# Patient Record
Sex: MALE | Race: WHITE | NOT HISPANIC OR LATINO | Employment: FULL TIME | URBAN - METROPOLITAN AREA
[De-identification: names, ages, dates, MRNs, and addresses within clinical notes are randomized per-mention and may not be internally consistent; named-entity substitution may affect disease eponyms.]

---

## 2019-02-02 ENCOUNTER — HOSPITAL ENCOUNTER (EMERGENCY)
Facility: CLINIC | Age: 24
Discharge: HOME | End: 2019-02-02
Attending: FAMILY MEDICINE | Admitting: FAMILY MEDICINE
Payer: COMMERCIAL

## 2019-02-02 ENCOUNTER — APPOINTMENT (OUTPATIENT)
Dept: GENERAL RADIOLOGY | Facility: CLINIC | Age: 24
End: 2019-02-02
Payer: COMMERCIAL

## 2019-02-02 VITALS
RESPIRATION RATE: 18 BRPM | OXYGEN SATURATION: 98 % | WEIGHT: 165 LBS | TEMPERATURE: 97.2 F | SYSTOLIC BLOOD PRESSURE: 130 MMHG | BODY MASS INDEX: 23.85 KG/M2 | DIASTOLIC BLOOD PRESSURE: 79 MMHG

## 2019-02-02 DIAGNOSIS — S43.101A SEPARATION OF RIGHT ACROMIOCLAVICULAR JOINT, INITIAL ENCOUNTER: ICD-10-CM

## 2019-02-02 PROCEDURE — 99283 EMERGENCY DEPT VISIT LOW MDM: CPT | Mod: Z6 | Performed by: FAMILY MEDICINE

## 2019-02-02 PROCEDURE — 99283 EMERGENCY DEPT VISIT LOW MDM: CPT | Performed by: FAMILY MEDICINE

## 2019-02-02 PROCEDURE — 73030 X-RAY EXAM OF SHOULDER: CPT | Mod: TC,RT
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
## 2019-02-02 NOTE — ED AVS SNAPSHOT
[facility] Emergency Department  [address] [place] [address]  Phone:  [phone]  Fax:  [phone]                                    Marcel Quan   MRN: [id number]    Department:  [facility] Emergency Department   Date of Visit:  2/2/2019           After Visit Summary Signature Page    I have received my discharge instructions, and my questions have been answered. I have discussed any challenges I see with this plan with the nurse or doctor.    ..........................................................................................................................................  Patient/Patient Representative Signature      ..........................................................................................................................................  Patient Representative Print Name and Relationship to Patient    ..................................................               ................................................  Date                                   Time    ..........................................................................................................................................  Reviewed by Signature/Title    ...................................................              ..............................................  Date                                               Time          22EPIC Rev 08/18
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
## 2019-02-02 NOTE — DISCHARGE INSTRUCTIONS
Your x-rays do not show any signs of fracture.  You most likely have an acromioclavicular separation of the right shoulder.  Please see the attached handout.  Ice frequently, and take ibuprofen up to 600 mg 4 times a day as needed for pain.  Use a sling for comfort.  Follow-up in 1 week if not improving.

## 2019-06-14 ENCOUNTER — HOSPITAL ENCOUNTER (EMERGENCY)
Facility: CLINIC | Age: 24
Discharge: HOME | End: 2019-06-14
Attending: FAMILY MEDICINE | Admitting: FAMILY MEDICINE
Payer: COMMERCIAL

## 2019-06-14 ENCOUNTER — APPOINTMENT (OUTPATIENT)
Dept: CT IMAGING | Facility: CLINIC | Age: 24
End: 2019-06-14
Attending: FAMILY MEDICINE
Payer: COMMERCIAL

## 2019-06-14 VITALS
BODY MASS INDEX: 23.85 KG/M2 | RESPIRATION RATE: 16 BRPM | DIASTOLIC BLOOD PRESSURE: 75 MMHG | SYSTOLIC BLOOD PRESSURE: 122 MMHG | HEART RATE: 66 BPM | TEMPERATURE: 98.4 F | WEIGHT: 165 LBS | OXYGEN SATURATION: 96 %

## 2019-06-14 DIAGNOSIS — S09.8XXA BLUNT HEAD TRAUMA, INITIAL ENCOUNTER: ICD-10-CM

## 2019-06-14 DIAGNOSIS — S19.80XA BLUNT TRAUMA OF NECK, INITIAL ENCOUNTER: ICD-10-CM

## 2019-06-14 PROCEDURE — 25000125 ZZHC RX 250: Performed by: FAMILY MEDICINE

## 2019-06-14 PROCEDURE — 25000128 H RX IP 250 OP 636: Performed by: FAMILY MEDICINE

## 2019-06-14 PROCEDURE — 99284 EMERGENCY DEPT VISIT MOD MDM: CPT | Mod: Z6 | Performed by: FAMILY MEDICINE

## 2019-06-14 PROCEDURE — 70491 CT SOFT TISSUE NECK W/DYE: CPT

## 2019-06-14 PROCEDURE — 99285 EMERGENCY DEPT VISIT HI MDM: CPT | Mod: 25 | Performed by: FAMILY MEDICINE

## 2019-06-14 RX ORDER — IOPAMIDOL 755 MG/ML
100 INJECTION, SOLUTION INTRAVASCULAR ONCE
Status: COMPLETED | OUTPATIENT
Start: 2019-06-14 | End: 2019-06-14

## 2019-06-14 RX ADMIN — IOPAMIDOL 80 ML: 755 INJECTION, SOLUTION INTRAVENOUS at 09:14

## 2019-06-14 RX ADMIN — SODIUM CHLORIDE 70 ML: 9 INJECTION, SOLUTION INTRAVENOUS at 09:14

## 2019-06-14 ASSESSMENT — ENCOUNTER SYMPTOMS
FEVER: 0
NUMBNESS: 0
RHINORRHEA: 0
COUGH: 0
SEIZURES: 0
SHORTNESS OF BREATH: 0
DIZZINESS: 0
PHOTOPHOBIA: 0
SORE THROAT: 1
APPETITE CHANGE: 0
TREMORS: 0
SPEECH DIFFICULTY: 0
LIGHT-HEADEDNESS: 0
VOICE CHANGE: 0
HEADACHES: 1
CONFUSION: 0
TROUBLE SWALLOWING: 0
WEAKNESS: 0

## 2019-06-14 NOTE — ED AVS SNAPSHOT
[facility] Emergency Department  [address] [place] [address]  Phone:  [phone]  Fax:  [phone]                                    Marcel Quan   MRN: [id number]    Department:  [facility] Emergency Department   Date of Visit:  6/14/2019           After Visit Summary Signature Page    I have received my discharge instructions, and my questions have been answered. I have discussed any challenges I see with this plan with the nurse or doctor.    ..........................................................................................................................................  Patient/Patient Representative Signature      ..........................................................................................................................................  Patient Representative Print Name and Relationship to Patient    ..................................................               ................................................  Date                                   Time    ..........................................................................................................................................  Reviewed by Signature/Title    ...................................................              ..............................................  Date                                               Time          22EPIC Rev 08/18
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
## 2019-07-28 ENCOUNTER — HOSPITAL ENCOUNTER (EMERGENCY)
Facility: CLINIC | Age: 24
Discharge: HOME | End: 2019-07-28
Attending: EMERGENCY MEDICINE | Admitting: EMERGENCY MEDICINE
Payer: COMMERCIAL

## 2019-07-28 VITALS
WEIGHT: 165 LBS | BODY MASS INDEX: 23.85 KG/M2 | SYSTOLIC BLOOD PRESSURE: 128 MMHG | HEART RATE: 69 BPM | DIASTOLIC BLOOD PRESSURE: 76 MMHG | RESPIRATION RATE: 18 BRPM | OXYGEN SATURATION: 99 % | TEMPERATURE: 97.5 F

## 2019-07-28 DIAGNOSIS — S91.312A LACERATION OF LEFT FOOT, INITIAL ENCOUNTER: ICD-10-CM

## 2019-07-28 PROCEDURE — 90715 TDAP VACCINE 7 YRS/> IM: CPT | Performed by: EMERGENCY MEDICINE

## 2019-07-28 PROCEDURE — 90471 IMMUNIZATION ADMIN: CPT | Performed by: EMERGENCY MEDICINE

## 2019-07-28 PROCEDURE — 25000128 H RX IP 250 OP 636: Performed by: EMERGENCY MEDICINE

## 2019-07-28 PROCEDURE — 99283 EMERGENCY DEPT VISIT LOW MDM: CPT | Mod: Z6 | Performed by: EMERGENCY MEDICINE

## 2019-07-28 PROCEDURE — 99283 EMERGENCY DEPT VISIT LOW MDM: CPT | Performed by: EMERGENCY MEDICINE

## 2019-07-28 RX ADMIN — CLOSTRIDIUM TETANI TOXOID ANTIGEN (FORMALDEHYDE INACTIVATED), CORYNEBACTERIUM DIPHTHERIAE TOXOID ANTIGEN (FORMALDEHYDE INACTIVATED), BORDETELLA PERTUSSIS TOXOID ANTIGEN (GLUTARALDEHYDE INACTIVATED), BORDETELLA PERTUSSIS FILAMENTOUS HEMAGGLUTININ ANTIGEN (FORMALDEHYDE INACTIVATED), BORDETELLA PERTUSSIS PERTACTIN ANTIGEN, AND BORDETELLA PERTUSSIS FIMBRIAE 2/3 ANTIGEN 0.5 ML: 5; 2; 2.5; 5; 3; 5 INJECTION, SUSPENSION INTRAMUSCULAR at 16:22

## 2019-07-28 NOTE — ED AVS SNAPSHOT
[facility] Emergency Department  [address] [place] [address]  Phone:  [phone]  Fax:  [phone]                                    Marcel Quan   MRN: [id number]    Department:  [facility] Emergency Department   Date of Visit:  7/28/2019           After Visit Summary Signature Page    I have received my discharge instructions, and my questions have been answered. I have discussed any challenges I see with this plan with the nurse or doctor.    ..........................................................................................................................................  Patient/Patient Representative Signature      ..........................................................................................................................................  Patient Representative Print Name and Relationship to Patient    ..................................................               ................................................  Date                                   Time    ..........................................................................................................................................  Reviewed by Signature/Title    ...................................................              ..............................................  Date                                               Time          22EPIC Rev 08/18

## 2019-07-28 NOTE — DISCHARGE INSTRUCTIONS
Return to the ER if you develop new or worsening symptoms.  Keep the wound clean and dry.  Apply antibiotic ointment daily.  Leave this pressure bandage on for a couple of days in order to avoid bleeding.

## 2019-07-28 NOTE — ED TRIAGE NOTES
"Pt states that 3 evenings ago he accidentally injured his left top of his foot with a broken piece of glass from a window.  He covered it, but it cont to bleed heavily at times.  \"It will just squirt out and is hard to stop.\"  Foot is painful.     He builds pools and maintains them.    "

## 2019-07-28 NOTE — ED PROVIDER NOTES
History     Chief Complaint   Patient presents with     Laceration     The history is provided by the patient.     Marcel Quan is a 23 year old male who is presenting to the emergency department with complaints of non-stop bleeding from a laceration on his left foot. The patient says he cut the inside of his foot on a piece of glass two days ago. He thinks it hit a vein/artery because every time he goes to clean the wound or change the dressing it starts to squirt out blood. The patient says the laceration will stop bleeding for a little bit, but any movement causes it to start up again. He wrapped the wound tightly and kept it clean.         Allergies:  Allergies   Allergen Reactions     No Known Drug Allergies        Problem List:    There are no active problems to display for this patient.       Past Medical History:    Past Medical History:   Diagnosis Date     Pneumonia      RSV (respiratory syncytial virus infection)        Past Surgical History:    Past Surgical History:   Procedure Laterality Date     NO HISTORY OF SURGERY         Family History:    Family History   Problem Relation Age of Onset     Family History Negative No family hx of        Social History:  Marital Status:  Single [1]  Social History     Tobacco Use     Smoking status: Current Every Day Smoker     Packs/day: 1.00     Years: 0.60     Pack years: 0.60     Types: Cigarettes     Last attempt to quit: 2011     Years since quittin.7     Smokeless tobacco: Former User     Quit date: 2011   Substance Use Topics     Alcohol use: Yes     Comment: 2013 Once a week , 2 drinks     Drug use: No        Medications:      ASPIRIN PO   polyethylene glycol (MIRALAX) powder   psyllium (METAMUCIL SMOOTH TEXTURE) 63 % POWD         Review of Systems   All other systems reviewed and are negative.      Physical Exam   BP: 128/76  Pulse: 69  Temp: 97.5  F (36.4  C)  Resp: 18  Weight: 74.8 kg (165 lb)  SpO2: 99 %      Physical Exam    Constitutional: He is oriented to person, place, and time. He appears well-developed and well-nourished. No distress.   HENT:   Head: Normocephalic and atraumatic.   Eyes: No scleral icterus.   Neck: Normal range of motion. Neck supple.   Cardiovascular: Normal rate.   Neurological: He is alert and oriented to person, place, and time.   Skin: Skin is warm and dry. No rash noted. He is not diaphoretic.   Superficial 1 cm V-shaped laceration over the medial aspect of the left great toe over a small vein.  No bleeding.  There is dried blood all over the foot.   Nursing note and vitals reviewed.      ED Course        Procedures                 No results found for this or any previous visit (from the past 24 hour(s)).    Medications   Tdap (tetanus-diphtheria-acell pertussis) (ADACEL) injection 0.5 mL (0.5 mLs Intramuscular Given 7/28/19 1622)       Assessments & Plan (with Medical Decision Making)  Superficial partially healed laceration over the medial aspect of the left great toe over a small vein.  The blood was cleansed by the ER tech and quick clot was placed along with a pressure bandage.  The patient tolerated the procedure well and there was no bleeding.  Return to ER precautions were discussed.  Tetanus vaccination updated.     I have reviewed the nursing notes.    I have reviewed the findings, diagnosis, plan and need for follow up with the patient.         Medication List      There are no discharge medications for this visit.         Final diagnoses:   Laceration of left foot, initial encounter   This document serves as a record of services personally performed by Aki Graham MD. It was created on their behalf by Tatyana Gudino, a trained medical scribe. The creation of this record is based on the provider's personal observations and the statements of the patient. This document has been checked and approved by the attending provider.  Note: Chart documentation done in part with Dragon Voice  Recognition software. Although reviewed after completion, some word and grammatical errors may remain.        7/28/2019   [facility] EMERGENCY DEPARTMENT     Aki Graham MD  07/28/19 5088

## 2019-12-11 ENCOUNTER — OFFICE VISIT (OUTPATIENT)
Dept: FAMILY MEDICINE | Facility: OTHER | Age: 24
End: 2019-12-11
Payer: COMMERCIAL

## 2019-12-11 VITALS
BODY MASS INDEX: 23.25 KG/M2 | SYSTOLIC BLOOD PRESSURE: 108 MMHG | RESPIRATION RATE: 16 BRPM | HEIGHT: 70 IN | WEIGHT: 162.4 LBS | DIASTOLIC BLOOD PRESSURE: 60 MMHG | HEART RATE: 70 BPM | OXYGEN SATURATION: 98 % | TEMPERATURE: 98.3 F

## 2019-12-11 DIAGNOSIS — Z48.02 ENCOUNTER FOR STAPLE REMOVAL: Primary | ICD-10-CM

## 2019-12-11 DIAGNOSIS — A74.9 CHLAMYDIA INFECTION: ICD-10-CM

## 2019-12-11 DIAGNOSIS — Z11.3 SCREEN FOR STD (SEXUALLY TRANSMITTED DISEASE): ICD-10-CM

## 2019-12-11 DIAGNOSIS — R30.0 DYSURIA: ICD-10-CM

## 2019-12-11 LAB
ALBUMIN UR-MCNC: ABNORMAL MG/DL
APPEARANCE UR: CLEAR
BILIRUB UR QL STRIP: NEGATIVE
COLOR UR AUTO: YELLOW
GLUCOSE UR STRIP-MCNC: NEGATIVE MG/DL
HGB UR QL STRIP: NEGATIVE
KETONES UR STRIP-MCNC: NEGATIVE MG/DL
LEUKOCYTE ESTERASE UR QL STRIP: NEGATIVE
NITRATE UR QL: NEGATIVE
PH UR STRIP: 6 PH (ref 5–7)
RBC #/AREA URNS AUTO: NORMAL /HPF
SOURCE: ABNORMAL
SP GR UR STRIP: >1.03 (ref 1–1.03)
UROBILINOGEN UR STRIP-ACNC: 0.2 EU/DL (ref 0.2–1)
WBC #/AREA URNS AUTO: NORMAL /HPF

## 2019-12-11 PROCEDURE — 86780 TREPONEMA PALLIDUM: CPT | Performed by: PHYSICIAN ASSISTANT

## 2019-12-11 PROCEDURE — 86803 HEPATITIS C AB TEST: CPT | Performed by: PHYSICIAN ASSISTANT

## 2019-12-11 PROCEDURE — 99203 OFFICE O/P NEW LOW 30 MIN: CPT | Performed by: PHYSICIAN ASSISTANT

## 2019-12-11 PROCEDURE — 87340 HEPATITIS B SURFACE AG IA: CPT | Performed by: PHYSICIAN ASSISTANT

## 2019-12-11 PROCEDURE — 87389 HIV-1 AG W/HIV-1&-2 AB AG IA: CPT | Performed by: PHYSICIAN ASSISTANT

## 2019-12-11 PROCEDURE — 86706 HEP B SURFACE ANTIBODY: CPT | Performed by: PHYSICIAN ASSISTANT

## 2019-12-11 PROCEDURE — 87591 N.GONORRHOEAE DNA AMP PROB: CPT | Performed by: PHYSICIAN ASSISTANT

## 2019-12-11 PROCEDURE — 81001 URINALYSIS AUTO W/SCOPE: CPT | Performed by: PHYSICIAN ASSISTANT

## 2019-12-11 PROCEDURE — 87491 CHLMYD TRACH DNA AMP PROBE: CPT | Performed by: PHYSICIAN ASSISTANT

## 2019-12-11 PROCEDURE — 36415 COLL VENOUS BLD VENIPUNCTURE: CPT | Performed by: PHYSICIAN ASSISTANT

## 2019-12-11 ASSESSMENT — MIFFLIN-ST. JEOR: SCORE: 1745.38
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
## 2019-12-12 LAB
C TRACH DNA SPEC QL NAA+PROBE: POSITIVE
HBV SURFACE AB SERPL IA-ACNC: 4.09 M[IU]/ML
HBV SURFACE AG SERPL QL IA: NONREACTIVE
HCV AB SERPL QL IA: NONREACTIVE
HIV 1+2 AB+HIV1 P24 AG SERPL QL IA: NONREACTIVE
N GONORRHOEA DNA SPEC QL NAA+PROBE: NEGATIVE
SPECIMEN SOURCE: ABNORMAL
SPECIMEN SOURCE: NORMAL
T PALLIDUM AB SER QL: NONREACTIVE

## 2019-12-13 RX ORDER — AZITHROMYCIN 500 MG/1
1000 TABLET, FILM COATED ORAL DAILY
Qty: 2 TABLET | Refills: 0 | Status: SHIPPED | OUTPATIENT
Start: 2019-12-13 | End: 2019-12-14

## 2020-09-25 ENCOUNTER — HOSPITAL ENCOUNTER (EMERGENCY)
Facility: CLINIC | Age: 25
Discharge: HOME | End: 2020-09-25
Attending: EMERGENCY MEDICINE | Admitting: EMERGENCY MEDICINE

## 2020-09-25 VITALS
OXYGEN SATURATION: 97 % | TEMPERATURE: 99 F | SYSTOLIC BLOOD PRESSURE: 118 MMHG | RESPIRATION RATE: 16 BRPM | HEART RATE: 77 BPM | WEIGHT: 184 LBS | DIASTOLIC BLOOD PRESSURE: 74 MMHG | BODY MASS INDEX: 26.05 KG/M2

## 2020-09-25 DIAGNOSIS — Z20.2 STD EXPOSURE: ICD-10-CM

## 2020-09-25 LAB
ALBUMIN UR-MCNC: NEGATIVE MG/DL
APPEARANCE UR: CLEAR
BILIRUB UR QL STRIP: NEGATIVE
COLOR UR AUTO: YELLOW
GLUCOSE UR STRIP-MCNC: NEGATIVE MG/DL
HGB UR QL STRIP: NEGATIVE
KETONES UR STRIP-MCNC: NEGATIVE MG/DL
LEUKOCYTE ESTERASE UR QL STRIP: NEGATIVE
NITRATE UR QL: NEGATIVE
PH UR STRIP: 5 PH (ref 5–7)
SOURCE: NORMAL
SP GR UR STRIP: 1.03 (ref 1–1.03)
UROBILINOGEN UR STRIP-MCNC: 0 MG/DL (ref 0–2)

## 2020-09-25 PROCEDURE — 87491 CHLMYD TRACH DNA AMP PROBE: CPT | Performed by: EMERGENCY MEDICINE

## 2020-09-25 PROCEDURE — 96372 THER/PROPH/DIAG INJ SC/IM: CPT | Performed by: EMERGENCY MEDICINE

## 2020-09-25 PROCEDURE — 25000128 H RX IP 250 OP 636: Performed by: EMERGENCY MEDICINE

## 2020-09-25 PROCEDURE — 87591 N.GONORRHOEAE DNA AMP PROB: CPT | Performed by: EMERGENCY MEDICINE

## 2020-09-25 PROCEDURE — 99284 EMERGENCY DEPT VISIT MOD MDM: CPT | Performed by: EMERGENCY MEDICINE

## 2020-09-25 PROCEDURE — 81003 URINALYSIS AUTO W/O SCOPE: CPT | Performed by: EMERGENCY MEDICINE

## 2020-09-25 PROCEDURE — 99284 EMERGENCY DEPT VISIT MOD MDM: CPT | Mod: Z6 | Performed by: EMERGENCY MEDICINE

## 2020-09-25 RX ORDER — CEFTRIAXONE SODIUM 1 G
250 VIAL (EA) INJECTION ONCE
Status: COMPLETED | OUTPATIENT
Start: 2020-09-25 | End: 2020-09-25

## 2020-09-25 RX ORDER — DOXYCYCLINE 100 MG/1
100 CAPSULE ORAL 2 TIMES DAILY
Qty: 28 CAPSULE | Refills: 0 | Status: SHIPPED | OUTPATIENT
Start: 2020-09-25 | End: 2020-10-09

## 2020-09-25 RX ADMIN — CEFTRIAXONE SODIUM 250 MG: 1 INJECTION, POWDER, FOR SOLUTION INTRAMUSCULAR; INTRAVENOUS at 11:26

## 2020-09-25 NOTE — ED TRIAGE NOTES
Pt comes in with complaints of bilateral flank pain. Pt states he has also been exposed to Chlamydia from his S/O. Pt states she was diagnosed 5 days ago and he has had intercourse with her since without any form of barrier protection.

## 2020-09-25 NOTE — ED AVS SNAPSHOT
[facility] Emergency Department  [address] [place] [address]  Phone:  [phone]  Fax:  [phone]                                    Marcel Quan   MRN: [id number]    Department:  [facility] Emergency Department   Date of Visit:  9/25/2020           After Visit Summary Signature Page    I have received my discharge instructions, and my questions have been answered. I have discussed any challenges I see with this plan with the nurse or doctor.    ..........................................................................................................................................  Patient/Patient Representative Signature      ..........................................................................................................................................  Patient Representative Print Name and Relationship to Patient    ..................................................               ................................................  Date                                   Time    ..........................................................................................................................................  Reviewed by Signature/Title    ...................................................              ..............................................  Date                                               Time          22EPIC Rev 08/18

## 2020-09-25 NOTE — ED PROVIDER NOTES
History     Chief Complaint   Patient presents with     Exposure to STD     Flank Pain     HPI  Marcel Quan is a 25 year old male who presents with concerns for exposure to chlamydia.  He states he is significant other was diagnosed 2 days ago.  He has had a previous history of chlamydia back in December of last year.  At that time they did check him for treponemal antibodies, HIV and hepatitis.  Gonorrhea was negative.  He states he is concerned that he may have had a kidney infection as he has bilateral flank pain.  Denies any chest pain or shortness of breath.  No cough.  Has mild diffuse abdominal pain without nausea or vomiting.  He said no diarrhea or change in his stooling pattern.  Denies any hematuria.  No penile lesions.  Denies any testicular swelling or pain.  Denies recent travel.  No exposure to infectious GI illness.  No treatment for his pain prior to arrival.  He denies a history of pyelonephritis or kidney stone.  Denies history of abdominal surgeries.    Allergies:  Allergies   Allergen Reactions     No Known Drug Allergies        Problem List:    There are no active problems to display for this patient.       Past Medical History:    Past Medical History:   Diagnosis Date     Pneumonia      RSV (respiratory syncytial virus infection)        Past Surgical History:    Past Surgical History:   Procedure Laterality Date     NO HISTORY OF SURGERY         Family History:    Family History   Problem Relation Age of Onset     Family History Negative No family hx of        Social History:  Marital Status:  Single [1]  Social History     Tobacco Use     Smoking status: Current Every Day Smoker     Packs/day: 1.00     Years: 0.60     Pack years: 0.60     Types: Cigarettes     Last attempt to quit: 2011     Years since quittin.8     Smokeless tobacco: Former User     Quit date: 2011   Substance Use Topics     Alcohol use: Yes     Comment: weekly     Drug use: No        Medications:     doxycycline hyclate (VIBRAMYCIN) 100 MG capsule          Review of Systems all other systems reviewed and are negative.    Physical Exam   BP: 122/63  Pulse: 79  Temp: 99  F (37.2  C)  Resp: 16  Weight: 83.5 kg (184 lb)  SpO2: 97 %      Physical Exam alert cooperative male does not look toxic or ill.  He has no scleral icterus.  Oral mucosa is moist.  Able speak in complete sentences.  Neck is supple.  Lungs were clear without adventitious sounds.  He has diffuse back and mild bilateral CVA tenderness  with the right being greater than left.  Abdominal exam reveals active bowel sounds.  On palpation he is diffusely mildly tender but does not localize.  He has no guarding or rebound.  There is no organomegaly or masses.  Genital exam he has a circumcised penis without lesion.  The testes are down bilaterally.  No testicular swelling or tenderness.  Cremasteric reflex is normal.  He has no rashes over the groin, penis or scrotum.    ED Course        Procedures               Critical Care time:  none               Results for orders placed or performed during the hospital encounter of 09/25/20 (from the past 24 hour(s))   UA reflex to Microscopic   Result Value Ref Range    Color Urine Yellow     Appearance Urine Clear     Glucose Urine Negative NEG^Negative mg/dL    Bilirubin Urine Negative NEG^Negative    Ketones Urine Negative NEG^Negative mg/dL    Specific Gravity Urine 1.027 1.003 - 1.035    Blood Urine Negative NEG^Negative    pH Urine 5.0 5.0 - 7.0 pH    Protein Albumin Urine Negative NEG^Negative mg/dL    Urobilinogen mg/dL 0.0 0.0 - 2.0 mg/dL    Nitrite Urine Negative NEG^Negative    Leukocyte Esterase Urine Negative NEG^Negative    Source Midstream Urine        Medications   cefTRIAXone (ROCEPHIN) injection 250 mg (has no administration in time range)     A dirty urine is obtained for GC/chlamydia.  Clean urine is obtained looking for possible infection and pyelonephritis.  Assessments & Plan (with Medical  Decision Making)   Marcel Quan is a 25 year old male who presents with concerns for exposure to chlamydia.  He states he is significant other was diagnosed 2 days ago.  He has had a previous history of chlamydia back in December of last year.  At that time they did check him for treponemal antibodies, HIV and hepatitis.  Gonorrhea was negative.  He states he is concerned that he may have had a kidney infection as he has bilateral flank pain.  Denies any chest pain or shortness of breath.  No cough.  Has mild diffuse abdominal pain without nausea or vomiting.  He said no diarrhea or change in his stooling pattern.  Denies any hematuria.  No penile lesions.  Denies any testicular swelling or pain.  Denies recent travel.  No exposure to infectious GI illness.  No treatment for his pain prior to arrival.  He denies a history of pyelonephritis or kidney stone.  Denies history of abdominal surgeries.  On presentation patient was afebrile and vitally stable.  He had mild CVA tenderness and diffuse muscular tenderness in the low back.  Mild nonlocalizing abdominal tenderness.  No guarding or rebound.  No penile lesions or testicular tenderness.  Normal cremasteric reflex.  No rash of the flank, abdomen, or groin/genitalia.  Urine was completely normal so doubt kidney stone or pyelonephritis.  With his unprotected exposure to chlamydia we do have a chlamydia and GC culture pending.  He is given a shot of Rocephin IM and will be on doxycycline.  He should not have unprotected intercourse until both he and his partner have been treated.  He can follow-up if he has new concerning symptoms.  I have reviewed the nursing notes.    I have reviewed the findings, diagnosis, plan and need for follow up with the patient.       New Prescriptions    DOXYCYCLINE HYCLATE (VIBRAMYCIN) 100 MG CAPSULE    Take 1 capsule (100 mg) by mouth 2 times daily for 14 days       Final diagnoses:   STD exposure       9/25/2020   [facility]  EMERGENCY DEPARTMENT     Boo Johnson MD  09/25/20 [id number]

## 2020-09-25 NOTE — DISCHARGE INSTRUCTIONS
Doxycycline as directed for 2 weeks.  Do not have unprotected intercourse until both you and your partner have completed your treatment.

## 2020-09-27 LAB
C TRACH DNA SPEC QL NAA+PROBE: NEGATIVE
N GONORRHOEA DNA SPEC QL NAA+PROBE: NEGATIVE
SPECIMEN SOURCE: NORMAL
SPECIMEN SOURCE: NORMAL

## 2020-09-27 NOTE — RESULT ENCOUNTER NOTE
Final result for both N. Gonorrhoeae PCR and Chlamydia Trachomatis PCR are NEGATIVE.  No treatment or change in treatment per [facility] Result protocol.

## 2023-08-08 ENCOUNTER — TELEPHONE (OUTPATIENT)
Dept: ORTHOPEDICS | Facility: CLINIC | Age: 28
End: 2023-08-08
Payer: COMMERCIAL

## 2023-08-08 ENCOUNTER — TRANSFERRED RECORDS (OUTPATIENT)
Dept: HEALTH INFORMATION MANAGEMENT | Facility: CLINIC | Age: 28
End: 2023-08-08

## 2023-08-08 NOTE — TELEPHONE ENCOUNTER
[facility] Call Center    Phone Message    May a detailed message be left on voicemail: yes     Reason for Call: Other: patient calling with new ankle fracture. Priority line asked for a TE to be sent. I asked patient to have records faxed to us.      Action Taken: Other: sent message to team     Travel Screening: Not Applicable

## 2023-08-08 NOTE — TELEPHONE ENCOUNTER
Appointment is scheduled already. Will triage if call comes back that it needs to come here.     [place]

## 2023-08-09 ENCOUNTER — HOSPITAL ENCOUNTER (OUTPATIENT)
Dept: CT IMAGING | Facility: CLINIC | Age: 28
Discharge: HOME | End: 2023-08-09
Attending: ORTHOPAEDIC SURGERY | Admitting: ORTHOPAEDIC SURGERY
Payer: COMMERCIAL

## 2023-08-09 ENCOUNTER — OFFICE VISIT (OUTPATIENT)
Dept: ORTHOPEDICS | Facility: CLINIC | Age: 28
End: 2023-08-09
Payer: OTHER MISCELLANEOUS

## 2023-08-09 VITALS
SYSTOLIC BLOOD PRESSURE: 116 MMHG | BODY MASS INDEX: 26.41 KG/M2 | DIASTOLIC BLOOD PRESSURE: 68 MMHG | WEIGHT: 195 LBS | OXYGEN SATURATION: 98 % | HEART RATE: 66 BPM | HEIGHT: 72 IN

## 2023-08-09 DIAGNOSIS — S99.911A RIGHT ANKLE INJURY, INITIAL ENCOUNTER: ICD-10-CM

## 2023-08-09 DIAGNOSIS — S82.891A CLOSED FRACTURE OF RIGHT ANKLE, INITIAL ENCOUNTER: ICD-10-CM

## 2023-08-09 DIAGNOSIS — S82.891A CLOSED FRACTURE OF RIGHT ANKLE, INITIAL ENCOUNTER: Primary | ICD-10-CM

## 2023-08-09 PROCEDURE — 73700 CT LOWER EXTREMITY W/O DYE: CPT | Mod: RT

## 2023-08-09 PROCEDURE — 99203 OFFICE O/P NEW LOW 30 MIN: CPT | Performed by: ORTHOPAEDIC SURGERY

## 2023-08-09 RX ORDER — OXYCODONE HYDROCHLORIDE 5 MG/1
5 TABLET ORAL EVERY 4 HOURS PRN
Qty: 20 TABLET | Refills: 0 | Status: SHIPPED | OUTPATIENT
Start: 2023-08-09 | End: 2023-08-14

## 2023-08-09 ASSESSMENT — PAIN SCALES - GENERAL: PAINLEVEL: WORST PAIN (10)

## 2023-08-09 NOTE — LETTER
8/9/2023         RE: Marcel Quan  [address]  [place] [facility] [address]        Dear Colleague,    Thank you for referring your patient, Marcel Quan, to the [facility]. Please see a copy of my visit note below.    SUBJECTIVE:  Marcel Quan is a 28 year old male who is seen as self referral for  right ankle injury that occurred.  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   Cause: Following acute injury.  Mechanism of injury: fall and twisted ankle on curb when he landed  Present symptoms: a lot of pain, diffuse. swelling    Previous treatment::  splint. Hasn't been elevating.    Prior history of related problems: no prior problems with this area in the past.    Past Medical History:   Diagnosis Date     Pneumonia     Has had several times     RSV (respiratory syncytial virus infection) 1998      Past Surgical History:   Procedure Laterality Date     NO HISTORY OF SURGERY         REVIEW OF SYSTEMS:  CONSTITUTIONAL:  NEGATIVE for fever, chills, change in weight  INTEGUMENTARY/SKIN:  NEGATIVE for worrisome rashes, moles or lesions  EYES:  NEGATIVE for vision changes or irritation  ENT/MOUTH:  NEGATIVE for ear, mouth and throat problems  RESP:  NEGATIVE for significant cough or SOB  BREAST:  NEGATIVE for masses, tenderness or discharge  CV:  NEGATIVE for chest pain, palpitations or peripheral edema  GI:  NEGATIVE for nausea, abdominal pain, heartburn, or change in bowel habits  :  Negative   MUSCULOSKELETAL:  See HPI above  NEURO:  NEGATIVE for weakness, dizziness or paresthesias  ENDOCRINE:  NEGATIVE for temperature intolerance, skin/hair changes  HEME/ALLERGY/IMMUNE:  NEGATIVE for bleeding problems  PSYCHIATRIC:  NEGATIVE for changes in mood or affect    EXAM:  /68 (BP Location: Left arm, Patient Position: Sitting, Cuff Size: Adult Regular)   Pulse 66   Ht 1.829 m (6')   Wt 88.5 kg (195 lb)   SpO2 98%   BMI 26.45 kg/m    GENERAL  APPEARANCE: healthy, alert, and no distress   GAIT: not tested   SKIN: intact  NEURO: Normal strength and tone, sensory exam grossly normal, mentation intact, and speech normal  Sensation: intact  PSYCH:  mentation appears normal and affect normal/bright    MUSCULOSKELETAL:    ANKLE  Inspection: Swelling: moderate,     Tender: medial and lateral  Range of Motion:  not tested --all movements painful      X-RAY INTERPRETATION  Xrays from yesterday, 8/8, shows a comminuted displaced medial malleolus fracture.  There is a linear calcification in the lateral gutter and a small fragment at the distal tib-fib joint.  Maybe some bony debris just above the talar dome  There is an acute oblique intra-articular fracture at the base of the   medial malleolus, which demonstrates approximately 9 mm of medial   displacement of the distal fragment.  There is associated distortion of   the medial mortise.  On the oblique view, there is an 8 mm linear density   at the medial aspect of the lateral malleolus, which projects over the   talofibular joint, concerning for an acute fracture (series [id number]).  On   the oblique view, there is a 2 mm density in the region of the lateral   tibial plafond and lateral talar dome, concerning for an acute fracture   (series [id number]).  No dislocation.  There is medial and lateral ankle soft   tissue swelling.     ASSESSMENT/PLAN  Comminuted medial mal fracture   Possible additional pilon fracture, non-displaced  Possible talar dome injury.    CT ordered  Surgery reccommended.   Elevation of the limb stressed to the patient. Rx for oxycodone written.     Follow up next week with Kieran Lauren for skin check, to see if skin can accommodate surgery.  Would ask Kieran Lauren to contact me about what he observes when looking at the skin.  Follow up by phone for CT results.    Work note: not needed.     GEOVANNY Hansen MD  Dept. Orthopedic Surgery  [facility]         Again, thank you for allowing me  to participate in the care of your patient.        Sincerely,        Jonah Hansen MD   [phone]

## 2023-08-09 NOTE — PROGRESS NOTES
SUBJECTIVE:  Marcel Quan is a 28 year old male who is seen as self referral for  right ankle injury that occurred.  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   Cause: Following acute injury.  Mechanism of injury: fall and twisted ankle on curb when he landed  Present symptoms: a lot of pain, diffuse. swelling    Previous treatment::  splint. Hasn't been elevating.    Prior history of related problems: no prior problems with this area in the past.    Past Medical History:   Diagnosis Date    Pneumonia     Has had several times    RSV (respiratory syncytial virus infection) 1998      Past Surgical History:   Procedure Laterality Date    NO HISTORY OF SURGERY         REVIEW OF SYSTEMS:  CONSTITUTIONAL:  NEGATIVE for fever, chills, change in weight  INTEGUMENTARY/SKIN:  NEGATIVE for worrisome rashes, moles or lesions  EYES:  NEGATIVE for vision changes or irritation  ENT/MOUTH:  NEGATIVE for ear, mouth and throat problems  RESP:  NEGATIVE for significant cough or SOB  BREAST:  NEGATIVE for masses, tenderness or discharge  CV:  NEGATIVE for chest pain, palpitations or peripheral edema  GI:  NEGATIVE for nausea, abdominal pain, heartburn, or change in bowel habits  :  Negative   MUSCULOSKELETAL:  See HPI above  NEURO:  NEGATIVE for weakness, dizziness or paresthesias  ENDOCRINE:  NEGATIVE for temperature intolerance, skin/hair changes  HEME/ALLERGY/IMMUNE:  NEGATIVE for bleeding problems  PSYCHIATRIC:  NEGATIVE for changes in mood or affect    EXAM:  /68 (BP Location: Left arm, Patient Position: Sitting, Cuff Size: Adult Regular)   Pulse 66   Ht 1.829 m (6')   Wt 88.5 kg (195 lb)   SpO2 98%   BMI 26.45 kg/m    GENERAL APPEARANCE: healthy, alert, and no distress   GAIT: not tested   SKIN: intact  NEURO: Normal strength and tone, sensory exam grossly normal, mentation intact, and speech normal  Sensation: intact  PSYCH:  mentation appears normal and affect  normal/bright    MUSCULOSKELETAL:    ANKLE  Inspection: Swelling: moderate,     Tender: medial and lateral  Range of Motion:  not tested --all movements painful      X-RAY INTERPRETATION  Xrays from yesterday, 8/8, shows a comminuted displaced medial malleolus fracture.  There is a linear calcification in the lateral gutter and a small fragment at the distal tib-fib joint.  Maybe some bony debris just above the talar dome  There is an acute oblique intra-articular fracture at the base of the   medial malleolus, which demonstrates approximately 9 mm of medial   displacement of the distal fragment.  There is associated distortion of   the medial mortise.  On the oblique view, there is an 8 mm linear density   at the medial aspect of the lateral malleolus, which projects over the   talofibular joint, concerning for an acute fracture (series [id number]).  On   the oblique view, there is a 2 mm density in the region of the lateral   tibial plafond and lateral talar dome, concerning for an acute fracture   (series [id number]).  No dislocation.  There is medial and lateral ankle soft   tissue swelling.     ASSESSMENT/PLAN  Comminuted medial mal fracture   Possible additional pilon fracture, non-displaced  Possible talar dome injury.    CT ordered  Surgery reccommended.   Elevation of the limb stressed to the patient. Rx for oxycodone written.     Follow up next week with Kieran Lauren for skin check, to see if skin can accommodate surgery.  Would ask Kieran Lauren to contact me about what he observes when looking at the skin.  Follow up by phone for CT results.    Work note: not needed.     GEOVANNY Hansen MD  Dept. Orthopedic Surgery  [facility]

## 2023-08-11 ENCOUNTER — TELEPHONE (OUTPATIENT)
Dept: ORTHOPEDICS | Facility: CLINIC | Age: 28
End: 2023-08-11
Payer: COMMERCIAL

## 2023-08-11 DIAGNOSIS — S82.891A CLOSED FRACTURE OF RIGHT ANKLE, INITIAL ENCOUNTER: Primary | ICD-10-CM

## 2023-08-11 RX ORDER — OXYCODONE HYDROCHLORIDE 5 MG/1
5 TABLET ORAL EVERY 6 HOURS PRN
Qty: 20 TABLET | Refills: 0 | Status: SHIPPED | OUTPATIENT
Start: 2023-08-11 | End: 2023-08-14

## 2023-08-11 NOTE — TELEPHONE ENCOUNTER
LOV 8/9/23, acute ankle injury after fall.  Rx sent that day for #20.  RN paged on call provider and forwarding in Epic.    Karmen Lemon MSN, RN   [facility], 8/11/2023 2:07 PM

## 2023-08-11 NOTE — TELEPHONE ENCOUNTER
[facility] Call Center    Phone Message    May a detailed message be left on voicemail: yes     Reason for Call: Medication Refill Request    Has the patient contacted the pharmacy for the refill? Yes   Name of medication being requested: Oxycodone  Provider who prescribed the medication:   Pharmacy: [facility] in the hospital  Date medication is needed: BY tomorrow   yes    Action Taken: Other: FZ    Travel Screening: Not Applicable

## 2023-08-14 ENCOUNTER — OFFICE VISIT (OUTPATIENT)
Dept: ORTHOPEDICS | Facility: CLINIC | Age: 28
End: 2023-08-14
Payer: OTHER MISCELLANEOUS

## 2023-08-14 VITALS
BODY MASS INDEX: 26.41 KG/M2 | DIASTOLIC BLOOD PRESSURE: 60 MMHG | SYSTOLIC BLOOD PRESSURE: 118 MMHG | TEMPERATURE: 97.9 F | WEIGHT: 195 LBS | HEIGHT: 72 IN

## 2023-08-14 DIAGNOSIS — S82.51XA CLOSED DISPLACED FRACTURE OF MEDIAL MALLEOLUS OF RIGHT TIBIA, INITIAL ENCOUNTER: Primary | ICD-10-CM

## 2023-08-14 PROCEDURE — 99213 OFFICE O/P EST LOW 20 MIN: CPT | Performed by: PHYSICIAN ASSISTANT

## 2023-08-14 RX ORDER — HYDROXYZINE PAMOATE 25 MG/1
25-50 CAPSULE ORAL 3 TIMES DAILY PRN
Qty: 30 CAPSULE | Refills: 1 | Status: ON HOLD | OUTPATIENT
Start: 2023-08-14 | End: 2023-08-23

## 2023-08-14 RX ORDER — OXYCODONE HYDROCHLORIDE 5 MG/1
5 TABLET ORAL EVERY 6 HOURS PRN
Qty: 20 TABLET | Refills: 0 | Status: ON HOLD | OUTPATIENT
Start: 2023-08-14 | End: 2023-08-23

## 2023-08-14 ASSESSMENT — PAIN SCALES - GENERAL: PAINLEVEL: EXTREME PAIN (9)

## 2023-08-14 NOTE — PROGRESS NOTES
Office Visit-Follow up    Chief Complaint: Marcel Quan is a 28 year old male who is being seen for   Chief Complaint   Patient presents with    RECHECK     Right ankle follow up    Work Comp     DOI: 8/9/2023       History of Present Illness:   Mechanism of Injury: Fall off of a ladder onto a curb rolling the ankle at work.  Location: Right medial ankle  Duration of Pain: Since date of injury  Rating of Pain: Moderate to severe  Pain Quality: Achy  Pain is better with: Rest and elevation  Pain is worse with: Ambulation and nighttime  Treatment so far consists of: Patient was seen by Dr. Hansen on 8/9/2023 and a CT was ordered and surgery was recommended and patient was stressed to elevate his ankle.  Patient also was given a prescription of oxycodone for 20 tablets.  Patient also got a refill of his oxycodone at 8/11/2023 by Dr. Hansen of another 20.  Patient was to follow-up with me just for a skin check today and to see if the patient could potentially have surgery on Wednesday.    Associated Features: Denies numbness or tingling shooting burning electric pain  Pain is Limiting: Use of right leg  Here to: Orthopedic consultation  Additional History: None    REVIEW OF SYSTEMS  Review of systems negative other than positive findings in HPI.    Physical Exam:  Vitals: /60   Temp 97.9  F (36.6  C) (Temporal)   Ht 1.829 m (6')   Wt 88.5 kg (195 lb)   BMI 26.45 kg/m    BMI= Body mass index is 26.45 kg/m .  Constitutional: healthy, alert and no acute distress   Psychiatric: mentation appears normal and affect normal/bright  NEURO: no focal deficits, CMS intact right lower extremity  RESP: Normal with easy respirations and no use of accessory muscles to breathe, no audible wheezing or retractions  CV: Calf soft and nontender to palpation, leg warm +2 dorsalis pedis pulse  SKIN: I was able to wrinkle the patient's skin around the medial malleolus today there is a small blister starting just inferior to the  medial malleolus.  Ecchymosis and erythema noted and swelling which can be seen in the photo below.  The rest of the ankle with no erythema, rashes, excoriation, or breakdown. No evidence of infection.   MUSCULOSKELETAL:  INSPECTION of right ankle: Skin as mentioned above.  No gross deformities, erythema, edema, atrophy or fasciculations.   PALPATION: Patient is tender to palpation over the medial malleolus.  No tenderness lateral malleolus or the calcaneus foot toes or Achilles tendon.  No Achilles tendon defect  ROM: Able to move all toes with flexion and extension without catching locking or pain.  I was able to passively gently move the ankle with about 5 degrees of plantar flexion and dorsiflexion to neutral.   STRENGTH: Able to fire muscles for moving all toes against gravity  SPECIAL TEST: Negative gentle drawer test.  GAIT: Not observed.  Patient in splint and in wheelchair.  Lymph: no palpable lymph nodes      Diagnostic Modalities:  Recent Results (from the past 744 hour(s))   X-ray Ankle 3+ Views Right    Narrative    INDICATION:  pain    COMPARISON:  None available    FINDINGS:  3 VIEWS RIGHT ANKLE    There is an acute oblique intra-articular fracture at the base of the   medial malleolus, which demonstrates approximately 9 mm of medial   displacement of the distal fragment.  There is associated distortion of   the medial mortise.  On the oblique view, there is an 8 mm linear density   at the medial aspect of the lateral malleolus, which projects over the   talofibular joint, concerning for an acute fracture (series 93296).  On   the oblique view, there is a 2 mm density in the region of the lateral   tibial plafond and lateral talar dome, concerning for an acute fracture   (series 13777).  No dislocation.  There is medial and lateral ankle soft   tissue swelling.    Impression    IMPRESSION:  There is an acute displaced intra-articular fracture at the base of the   medial malleolus as described above.   There is a 2 mm density in the   region of the lateral tibial plafond and lateral talar dome, concerning   for an acute fracture.  There is an 8 mm linear density at the medial   aspect of the lateral malleolus, concerning for an acute fracture.   CT Ankle Right w/o Contrast    Narrative    CT ANKLE RIGHT WITHOUT CONTRAST August 9, 2023 10:39 AM    INDICATION: Ankle fracture.    COMPARISON: Right ankle radiograph dated 8/8/2023.    TECHNIQUE: Noncontrast. Axial, sagittal and coronal thin-section  reconstruction. Dose reduction techniques were used.     FINDINGS:   BONES:  -There is an acute, comminuted and displaced fracture of the medial  malleolus extending toward the posterior malleolus with two dominant  fracture fragments.    There are also multiple very small chip or avulsion fractures along  the lateral aspect of the talus, one of which appears interposed  between the tibial plafond and lateral talar dome on series 5, image  29). No fibular fracture is identified.    SOFT TISSUES:  -There is diffuse subcutaneous soft tissue edema over the ankle,  without a drainable fluid collection.    Of note, the tibialis posterior tendon courses between the two  dominant comminuted medial/posterior malleolar fracture fragments and  is probably entrapped, best seen on series 4, image 141.      Impression    IMPRESSION:  1.  Acute, comminuted and displaced fracture of the medial malleolus  extending toward the posterior malleolus with two dominant fracture  fragments. The tibialis posterior tendon courses between these two  fragments and is probably entrapped.  2.  There are also multiple very small chip or avulsion fractures  along the lateral aspect of the talus, with one of these tiny  fragments interposed between the tibial plafond and lateral talar  dome. No fibular fracture is identified.    AMANDA FAY MD         SYSTEM ID:  [id number]     I agree with the above readings    Independent visualization of the images  was performed.    MEDIA:         Impression: 1.  Right ankle medial malleolus fracture, displaced    Plan:  All of the above pertinent physical exam and imaging modalities findings was reviewed with Marcel and a friend that is with him today.    FOCUSED PLAN:   Patient doing okay today although stating he is having a lot of pain.  He has gotten 20 tablets of oxycodone 8/9/2023 and another 20 on 8/11/2023 and states that they only have 2 left.  He inquires about a higher dose however I do not want him to take too much prior to surgery for the fear that the medication will not work as well after the surgery.  I did refill his oxycodone and give him a little muscle relaxant to help him at night as he struggles at night.  I reviewed the CT scan with the patient.  I discussed this patient with Dr. Hansen earlier today and I feel that the swelling is not that bad but I am concerned about the blister that is starting just inferior to the medial malleolus as a deterrent to surgery for a possible source of infection.  I am awaiting Dr. Hansen response as he is in surgery now but if my guess is that the patient will follow-up in 1 week for recheck of the ankle and see if the skin is ready then.  Patient educated on elevation above heart level at all times.  Patient placed back in his posterior splint with new padding and new Ace wrap.  Follow-up with Dr. Hansen in 1 week.    Re-x-ray on return: No      This note was dictated with Vault Dragon.    Hosea Lauren PA-C

## 2023-08-14 NOTE — LETTER
8/14/2023         RE: Marcel Quan  [address]  [facility] [address]        Dear Colleague,    Thank you for referring your patient, Marcel Quan, to the [facility]. Please see a copy of my visit note below.    Office Visit-Follow up    Chief Complaint: Marcel Quan is a 28 year old male who is being seen for   Chief Complaint   Patient presents with     RECHECK     Right ankle follow up     Work Comp     DOI: 8/9/2023       History of Present Illness:   Mechanism of Injury: Fall off of a ladder onto a curb rolling the ankle at work.  Location: Right medial ankle  Duration of Pain: Since date of injury  Rating of Pain: Moderate to severe  Pain Quality: Achy  Pain is better with: Rest and elevation  Pain is worse with: Ambulation and nighttime  Treatment so far consists of: Patient was seen by Dr. Hansen on 8/9/2023 and a CT was ordered and surgery was recommended and patient was stressed to elevate his ankle.  Patient also was given a prescription of oxycodone for 20 tablets.  Patient also got a refill of his oxycodone at 8/11/2023 by Dr. Hansen of another 20.  Patient was to follow-up with me just for a skin check today and to see if the patient could potentially have surgery on Wednesday.    Associated Features: Denies numbness or tingling shooting burning electric pain  Pain is Limiting: Use of right leg  Here to: Orthopedic consultation  Additional History: None    REVIEW OF SYSTEMS  Review of systems negative other than positive findings in HPI.    Physical Exam:  Vitals: /60   Temp 97.9  F (36.6  C) (Temporal)   Ht 1.829 m (6')   Wt 88.5 kg (195 lb)   BMI 26.45 kg/m    BMI= Body mass index is 26.45 kg/m .  Constitutional: healthy, alert and no acute distress   Psychiatric: mentation appears normal and affect normal/bright  NEURO: no focal deficits, CMS intact right lower extremity  RESP: Normal with easy respirations and no use of accessory muscles to  breathe, no audible wheezing or retractions  CV: Calf soft and nontender to palpation, leg warm +2 dorsalis pedis pulse  SKIN: I was able to wrinkle the patient's skin around the medial malleolus today there is a small blister starting just inferior to the medial malleolus.  Ecchymosis and erythema noted and swelling which can be seen in the photo below.  The rest of the ankle with no erythema, rashes, excoriation, or breakdown. No evidence of infection.   MUSCULOSKELETAL:  INSPECTION of right ankle: Skin as mentioned above.  No gross deformities, erythema, edema, atrophy or fasciculations.   PALPATION: Patient is tender to palpation over the medial malleolus.  No tenderness lateral malleolus or the calcaneus foot toes or Achilles tendon.  No Achilles tendon defect  ROM: Able to move all toes with flexion and extension without catching locking or pain.  I was able to passively gently move the ankle with about 5 degrees of plantar flexion and dorsiflexion to neutral.   STRENGTH: Able to fire muscles for moving all toes against gravity  SPECIAL TEST: Negative gentle drawer test.  GAIT: Not observed.  Patient in splint and in wheelchair.  Lymph: no palpable lymph nodes      Diagnostic Modalities:  Recent Results (from the past 744 hour(s))   X-ray Ankle 3+ Views Right    Narrative    INDICATION:  pain    COMPARISON:  None available    FINDINGS:  3 VIEWS RIGHT ANKLE    There is an acute oblique intra-articular fracture at the base of the   medial malleolus, which demonstrates approximately 9 mm of medial   displacement of the distal fragment.  There is associated distortion of   the medial mortise.  On the oblique view, there is an 8 mm linear density   at the medial aspect of the lateral malleolus, which projects over the   talofibular joint, concerning for an acute fracture (series 52471).  On   the oblique view, there is a 2 mm density in the region of the lateral   tibial plafond and lateral talar dome, concerning  for an acute fracture   (series 93342).  No dislocation.  There is medial and lateral ankle soft   tissue swelling.    Impression    IMPRESSION:  There is an acute displaced intra-articular fracture at the base of the   medial malleolus as described above.  There is a 2 mm density in the   region of the lateral tibial plafond and lateral talar dome, concerning   for an acute fracture.  There is an 8 mm linear density at the medial   aspect of the lateral malleolus, concerning for an acute fracture.   CT Ankle Right w/o Contrast    Narrative    CT ANKLE RIGHT WITHOUT CONTRAST August 9, 2023 10:39 AM    INDICATION: Ankle fracture.    COMPARISON: Right ankle radiograph dated 8/8/2023.    TECHNIQUE: Noncontrast. Axial, sagittal and coronal thin-section  reconstruction. Dose reduction techniques were used.     FINDINGS:   BONES:  -There is an acute, comminuted and displaced fracture of the medial  malleolus extending toward the posterior malleolus with two dominant  fracture fragments.    There are also multiple very small chip or avulsion fractures along  the lateral aspect of the talus, one of which appears interposed  between the tibial plafond and lateral talar dome on series 5, image  29). No fibular fracture is identified.    SOFT TISSUES:  -There is diffuse subcutaneous soft tissue edema over the ankle,  without a drainable fluid collection.    Of note, the tibialis posterior tendon courses between the two  dominant comminuted medial/posterior malleolar fracture fragments and  is probably entrapped, best seen on series 4, image 141.      Impression    IMPRESSION:  1.  Acute, comminuted and displaced fracture of the medial malleolus  extending toward the posterior malleolus with two dominant fracture  fragments. The tibialis posterior tendon courses between these two  fragments and is probably entrapped.  2.  There are also multiple very small chip or avulsion fractures  along the lateral aspect of the talus, with  one of these tiny  fragments interposed between the tibial plafond and lateral talar  dome. No fibular fracture is identified.    AMANDA FAY MD         SYSTEM ID:  [id number]     I agree with the above readings    Independent visualization of the images was performed.    MEDIA:         Impression: 1.  Right ankle medial malleolus fracture, displaced    Plan:  All of the above pertinent physical exam and imaging modalities findings was reviewed with Marcel and a friend that is with him today.    FOCUSED PLAN:   Patient doing okay today although stating he is having a lot of pain.  He has gotten 20 tablets of oxycodone 8/9/2023 and another 20 on 8/11/2023 and states that they only have 2 left.  He inquires about a higher dose however I do not want him to take too much prior to surgery for the fear that the medication will not work as well after the surgery.  I did refill his oxycodone and give him a little muscle relaxant to help him at night as he struggles at night.  I reviewed the CT scan with the patient.  I discussed this patient with Dr. Hansen earlier today and I feel that the swelling is not that bad but I am concerned about the blister that is starting just inferior to the medial malleolus as a deterrent to surgery for a possible source of infection.  I am awaiting Dr. Hansen response as he is in surgery now but if my guess is that the patient will follow-up in 1 week for recheck of the ankle and see if the skin is ready then.  Patient educated on elevation above heart level at all times.  Patient placed back in his posterior splint with new padding and new Ace wrap.  Follow-up with Dr. Hansen in 1 week.    Re-x-ray on return: No      This note was dictated with Yerdle.    Hosea Lauren PA-C                Again, thank you for allowing me to participate in the care of your patient.        Sincerely,        Hosea Lauren PA-C

## 2023-08-15 ENCOUNTER — PREP FOR PROCEDURE (OUTPATIENT)
Dept: ORTHOPEDICS | Facility: CLINIC | Age: 28
End: 2023-08-15
Payer: COMMERCIAL

## 2023-08-15 ENCOUNTER — MYC MEDICAL ADVICE (OUTPATIENT)
Dept: ORTHOPEDICS | Facility: CLINIC | Age: 28
End: 2023-08-15
Payer: COMMERCIAL

## 2023-08-15 DIAGNOSIS — S82.51XS CLOSED DISPLACED FRACTURE OF MEDIAL MALLEOLUS OF RIGHT TIBIA, SEQUELA: Primary | ICD-10-CM

## 2023-08-15 NOTE — TELEPHONE ENCOUNTER
Requested note mailed today to listed home address of patient . Copy sent to patient via [employer] as well.  See note for details  Anil SCHAEFER

## 2023-08-16 ENCOUNTER — PREP FOR PROCEDURE (OUTPATIENT)
Dept: ORTHOPEDICS | Facility: CLINIC | Age: 28
End: 2023-08-16
Payer: COMMERCIAL

## 2023-08-16 ENCOUNTER — TELEPHONE (OUTPATIENT)
Dept: ORTHOPEDICS | Facility: CLINIC | Age: 28
End: 2023-08-16
Payer: COMMERCIAL

## 2023-08-16 DIAGNOSIS — S82.891D CLOSED FRACTURE OF RIGHT ANKLE WITH ROUTINE HEALING, SUBSEQUENT ENCOUNTER: Primary | ICD-10-CM

## 2023-08-16 NOTE — TELEPHONE ENCOUNTER
Type of surgery: OPEN REDUCTION INTERNAL FIXATION, FRACTURE, RIGHT ANKLE   Location of surgery: [facility]   Date and time of surgery: 8/21   Surgeon: Tyrone   Pre-Op Appt Date: 8/18  Post-Op Appt Date: 9/13   Packet sent out: Yes  Pre-cert/Authorization completed:  No  Date:

## 2023-08-17 NOTE — TELEPHONE ENCOUNTER
Saida villafana [facility] called and noted that there is no room available on 08/21/2023  for this surgery until after 6:00 pm or you can schedule for Tuesday 08/22/2023 after 12:00 pm. Please advise Thank you

## 2023-08-18 ENCOUNTER — OFFICE VISIT (OUTPATIENT)
Dept: FAMILY MEDICINE | Facility: OTHER | Age: 28
End: 2023-08-18
Payer: OTHER MISCELLANEOUS

## 2023-08-18 VITALS
HEART RATE: 76 BPM | BODY MASS INDEX: 26.41 KG/M2 | RESPIRATION RATE: 20 BRPM | DIASTOLIC BLOOD PRESSURE: 68 MMHG | OXYGEN SATURATION: 95 % | TEMPERATURE: 98 F | SYSTOLIC BLOOD PRESSURE: 116 MMHG | HEIGHT: 72 IN | WEIGHT: 195 LBS

## 2023-08-18 DIAGNOSIS — Z01.818 PREOP GENERAL PHYSICAL EXAM: Primary | ICD-10-CM

## 2023-08-18 DIAGNOSIS — S82.891A ANKLE FRACTURE, RIGHT, CLOSED, INITIAL ENCOUNTER: ICD-10-CM

## 2023-08-18 LAB
ALBUMIN SERPL BCG-MCNC: 4.6 G/DL (ref 3.5–5.2)
ALP SERPL-CCNC: 73 U/L (ref 40–129)
ALT SERPL W P-5'-P-CCNC: 28 U/L (ref 0–70)
ANION GAP SERPL CALCULATED.3IONS-SCNC: 15 MMOL/L (ref 7–15)
AST SERPL W P-5'-P-CCNC: 24 U/L (ref 0–45)
BASOPHILS # BLD AUTO: 0 10E3/UL (ref 0–0.2)
BASOPHILS NFR BLD AUTO: 0 %
BILIRUB SERPL-MCNC: 0.2 MG/DL
BUN SERPL-MCNC: 19.8 MG/DL (ref 6–20)
CALCIUM SERPL-MCNC: 9.8 MG/DL (ref 8.6–10)
CHLORIDE SERPL-SCNC: 102 MMOL/L (ref 98–107)
CREAT SERPL-MCNC: 0.9 MG/DL (ref 0.67–1.17)
DEPRECATED HCO3 PLAS-SCNC: 22 MMOL/L (ref 22–29)
EOSINOPHIL # BLD AUTO: 0.1 10E3/UL (ref 0–0.7)
EOSINOPHIL NFR BLD AUTO: 2 %
ERYTHROCYTE [DISTWIDTH] IN BLOOD BY AUTOMATED COUNT: 11.7 % (ref 10–15)
GFR SERPL CREATININE-BSD FRML MDRD: >90 ML/MIN/1.73M2
GLUCOSE SERPL-MCNC: 103 MG/DL (ref 70–99)
HCT VFR BLD AUTO: 39.4 % (ref 40–53)
HGB BLD-MCNC: 13.7 G/DL (ref 13.3–17.7)
IMM GRANULOCYTES # BLD: 0 10E3/UL
IMM GRANULOCYTES NFR BLD: 0 %
LYMPHOCYTES # BLD AUTO: 1.3 10E3/UL (ref 0.8–5.3)
LYMPHOCYTES NFR BLD AUTO: 26 %
MCH RBC QN AUTO: 31.5 PG (ref 26.5–33)
MCHC RBC AUTO-ENTMCNC: 34.8 G/DL (ref 31.5–36.5)
MCV RBC AUTO: 91 FL (ref 78–100)
MONOCYTES # BLD AUTO: 0.5 10E3/UL (ref 0–1.3)
MONOCYTES NFR BLD AUTO: 10 %
NEUTROPHILS # BLD AUTO: 3 10E3/UL (ref 1.6–8.3)
NEUTROPHILS NFR BLD AUTO: 62 %
PLATELET # BLD AUTO: 273 10E3/UL (ref 150–450)
POTASSIUM SERPL-SCNC: 4.3 MMOL/L (ref 3.4–5.3)
PROT SERPL-MCNC: 7.8 G/DL (ref 6.4–8.3)
RBC # BLD AUTO: 4.35 10E6/UL (ref 4.4–5.9)
SODIUM SERPL-SCNC: 139 MMOL/L (ref 136–145)
WBC # BLD AUTO: 4.9 10E3/UL (ref 4–11)

## 2023-08-18 PROCEDURE — 85025 COMPLETE CBC W/AUTO DIFF WBC: CPT | Performed by: PHYSICIAN ASSISTANT

## 2023-08-18 PROCEDURE — 80053 COMPREHEN METABOLIC PANEL: CPT | Performed by: PHYSICIAN ASSISTANT

## 2023-08-18 PROCEDURE — 36415 COLL VENOUS BLD VENIPUNCTURE: CPT | Performed by: PHYSICIAN ASSISTANT

## 2023-08-18 PROCEDURE — 99204 OFFICE O/P NEW MOD 45 MIN: CPT | Performed by: PHYSICIAN ASSISTANT

## 2023-08-18 ASSESSMENT — PAIN SCALES - GENERAL: PAINLEVEL: NO PAIN (0)

## 2023-08-18 NOTE — TELEPHONE ENCOUNTER
Patient has been rescheduled to Wednesday 08/23/2023 in [place] to follow Dr. Hansen clinic. I have sent a message to staffing to close his [facility] clinic at 3:30 pm. I spoke with patient to advise, and I am sending a new surgery packet with new date and time information.

## 2023-08-18 NOTE — PROGRESS NOTES
[address]  [facility] [address]  Phone: [phone]  Primary Provider: No Ref-Primary, Physician  Pre-op Performing Provider: RAVI OLIVARES      PREOPERATIVE EVALUATION:  Today's date: 8/18/2023    Marcel Quan is a 28 year old male who presents for a preoperative evaluation.      8/18/2023     9:18 AM   Additional Questions   Roomed by AJ   Accompanied by Girlfriend       Surgical Information:  Surgery/Procedure: OPEN REDUCTION INTERNAL FIXATION, FRACTURE, RIGHT ANKLE   Surgery Location: [facility]   Surgeon: Jonah Hansen MD   Surgery Date: 8/23/23  Time of Surgery: 4:00 pm   Where patient plans to recover: At home with family  Fax number for surgical facility: Note does not need to be faxed, will be available electronically in Epic.    Assessment & Plan     The proposed surgical procedure is considered LOW risk.    Preop general physical exam  Ankle fracture, right, closed, initial encounter  Cleared for surgical intervention without further concerns at this point time.  Labs pending at time of dictation.  Follow-up with specialist and primary care provider as needed.  - CBC with platelets and differential; Future  - Comprehensive metabolic panel (BMP + Alb, Alk Phos, ALT, AST, Total. Bili, TP); Future            - No identified additional risk factors other than previously addressed    Antiplatelet or Anticoagulation Medication Instructions:   - Patient is on no antiplatelet or anticoagulation medications.    Additional Medication Instructions:  Advised only medication the day of surgical intervention    RECOMMENDATION:  APPROVAL GIVEN to proceed with proposed procedure, without further diagnostic evaluation.    Subjective       HPI related to upcoming procedure: Right ankle fracture in need of surgical intervention        8/18/2023     9:15 AM   Preop Questions   1. Have you ever had a heart attack or stroke? No   2. Have  you ever had surgery on your heart or blood vessels, such as a stent placement, a coronary artery bypass, or surgery on an artery in your head, neck, heart, or legs? No   3. Do you have chest pain with activity? No   4. Do you have a history of  heart failure? No   5. Do you currently have a cold, bronchitis or symptoms of other infection? No   6. Do you have a cough, shortness of breath, or wheezing? No   7. Do you or anyone in your family have previous history of blood clots? No   8. Do you or does anyone in your family have a serious bleeding problem such as prolonged bleeding following surgeries or cuts? No   9. Have you ever had problems with anemia or been told to take iron pills? No   10. Have you had any abnormal blood loss such as black, tarry or bloody stools? No   11. Have you ever had a blood transfusion? No   12. Are you willing to have a blood transfusion if it is medically needed before, during, or after your surgery? Yes   13. Have you or any of your relatives ever had problems with anesthesia? No   14. Do you have sleep apnea, excessive snoring or daytime drowsiness? No   15. Do you have any artifical heart valves or other implanted medical devices like a pacemaker, defibrillator, or continuous glucose monitor? No   16. Do you have artificial joints? No   17. Are you allergic to latex? No       Health Care Directive:  Patient does not have a Health Care Directive or Living Will: Discussed advance care planning with patient; however, patient declined at this time.    Preoperative Review of :   reviewed - controlled substances reflected in medication list.     Review of Systems  CONSTITUTIONAL: NEGATIVE for fever, chills, change in weight  INTEGUMENTARY/SKIN: NEGATIVE for worrisome rashes, moles or lesions  EYES: NEGATIVE for vision changes or irritation  ENT/MOUTH: NEGATIVE for ear, mouth and throat problems  RESP: NEGATIVE for significant cough or SOB  CV: NEGATIVE for chest pain, palpitations  or peripheral edema  GI: NEGATIVE for nausea, abdominal pain, heartburn, or change in bowel habits  : NEGATIVE for frequency, dysuria, or hematuria  MUSCULOSKELETAL: NEGATIVE for significant arthralgias or myalgia  NEURO: NEGATIVE for weakness, dizziness or paresthesias  ENDOCRINE: NEGATIVE for temperature intolerance, skin/hair changes  HEME: NEGATIVE for bleeding problems  PSYCHIATRIC: NEGATIVE for changes in mood or affect    There are no problems to display for this patient.     Past Medical History:   Diagnosis Date    Pneumonia     Has had several times    RSV (respiratory syncytial virus infection)      Past Surgical History:   Procedure Laterality Date    NO HISTORY OF SURGERY       Current Outpatient Medications   Medication Sig Dispense Refill    oxyCODONE (ROXICODONE) 5 MG tablet Take 1 tablet (5 mg) by mouth every 6 hours as needed for pain 20 tablet 0    hydrOXYzine (VISTARIL) 25 MG capsule Take 1-2 capsules (25-50 mg) by mouth 3 times daily as needed for itching (Patient not taking: Reported on 2023) 30 capsule 1       Allergies   Allergen Reactions    No Known Drug Allergy         Social History     Tobacco Use    Smoking status: Every Day     Packs/day: 1.00     Years: 0.60     Pack years: 0.60     Types: Cigarettes     Last attempt to quit: 2011     Years since quittin.7    Smokeless tobacco: Former     Quit date: 2011   Substance Use Topics    Alcohol use: Yes     Comment: weekly     Family History   Problem Relation Age of Onset    Family History Negative No family hx of      History   Drug Use No         Objective     /68   Pulse 76   Temp 98  F (36.7  C) (Temporal)   Resp 20   Ht 6' (1.829 m)   Wt 195 lb (88.5 kg)   SpO2 95%   BMI 26.45 kg/m      Physical Exam    GENERAL APPEARANCE: healthy, alert and no distress     EYES: EOMI,  PERRL     HENT: ear canals and TM's normal and nose and mouth without ulcers or lesions     NECK: no adenopathy, no asymmetry,  masses, or scars and thyroid normal to palpation     RESP: lungs clear to auscultation - no rales, rhonchi or wheezes     CV: regular rates and rhythm, normal S1 S2, no S3 or S4 and no murmur, click or rub     ABDOMEN:  soft, nontender, no HSM or masses and bowel sounds normal     MS: Other than the fact that his right ankle is immobilized remainder of his extremities appear to be within normal limits.     SKIN: no suspicious lesions or rashes     NEURO: Normal strength and tone, sensory exam grossly normal, mentation intact and speech normal     PSYCH: mentation appears normal. and affect normal/bright     LYMPHATICS: No cervical adenopathy    No results for input(s): HGB, PLT, INR, NA, POTASSIUM, CR, A1C in the last 94560 hours.     Diagnostics:  Labs pending at this time.  Results will be reviewed when available.   No EKG required for low risk surgery (cataract, skin procedure, breast biopsy, etc).    Revised Cardiac Risk Index (RCRI):  The patient has the following serious cardiovascular risks for perioperative complications:   - No serious cardiac risks = 0 points     RCRI Interpretation: 1 point: Class II (low risk - 0.9% complication rate)         Signed Electronically by: Jose M Yo PA-C  Copy of this evaluation report is provided to requesting physician.

## 2023-08-19 ENCOUNTER — HEALTH MAINTENANCE LETTER (OUTPATIENT)
Age: 28
End: 2023-08-19

## 2023-08-21 ENCOUNTER — ANESTHESIA (OUTPATIENT)
Dept: SURGERY | Facility: CLINIC | Age: 28
End: 2023-08-21
Payer: OTHER MISCELLANEOUS

## 2023-08-21 ENCOUNTER — ANESTHESIA EVENT (OUTPATIENT)
Dept: SURGERY | Facility: CLINIC | Age: 28
End: 2023-08-21
Payer: OTHER MISCELLANEOUS

## 2023-08-21 ASSESSMENT — LIFESTYLE VARIABLES: TOBACCO_USE: 1

## 2023-08-21 NOTE — ANESTHESIA PREPROCEDURE EVALUATION
Anesthesia Pre-Procedure Evaluation    Patient: Marcel Quan   MRN: [id number] : 1995        Procedure : Procedure(s):  OPEN REDUCTION INTERNAL FIXATION, FRACTURE, RIGHT ANKLE          Past Medical History:   Diagnosis Date     Pneumonia     Has had several times     RSV (respiratory syncytial virus infection)       Past Surgical History:   Procedure Laterality Date     NO HISTORY OF SURGERY        Allergies   Allergen Reactions     No Known Drug Allergy       Social History     Tobacco Use     Smoking status: Every Day     Packs/day: 1.00     Years: 0.60     Pack years: 0.60     Types: Cigarettes     Last attempt to quit: 2011     Years since quittin.7     Smokeless tobacco: Former     Quit date: 2011   Substance Use Topics     Alcohol use: Yes     Comment: weekly      Wt Readings from Last 1 Encounters:   23 88.5 kg (195 lb)        Anesthesia Evaluation   Pt has had prior anesthetic. Type: MAC and General.    No history of anesthetic complications       ROS/MED HX  ENT/Pulmonary:     (+)                tobacco use, Current use, 1 packs/day,                     Neurologic:  - neg neurologic ROS     Cardiovascular:  - neg cardiovascular ROS     METS/Exercise Tolerance: >4 METS    Hematologic:  - neg hematologic  ROS     Musculoskeletal:  - neg musculoskeletal ROS     GI/Hepatic:  - neg GI/hepatic ROS     Renal/Genitourinary:  - neg Renal ROS     Endo:  - neg endo ROS     Psychiatric/Substance Use:  - neg psychiatric ROS     Infectious Disease:  - neg infectious disease ROS     Malignancy:  - neg malignancy ROS     Other:  - neg other ROS          Physical Exam    Airway  airway exam normal      Mallampati: II   TM distance: > 3 FB   Neck ROM: full   Mouth opening: > 3 cm    Respiratory Devices and Support         Dental  no notable dental history     (+) Minor Abnormalities - some fillings, tiny chips      Cardiovascular   cardiovascular exam normal       Rhythm and rate: regular  and normal     Pulmonary   pulmonary exam normal        breath sounds clear to auscultation       OUTSIDE LABS:  CBC:   Lab Results   Component Value Date    WBC 4.9 08/18/2023    WBC 6.3 07/30/2013    HGB 13.7 08/18/2023    HGB 14.9 07/30/2013    HCT 39.4 (L) 08/18/2023    HCT 43.8 07/30/2013     08/18/2023     07/30/2013     BMP:   Lab Results   Component Value Date     08/18/2023     07/30/2013    POTASSIUM 4.3 08/18/2023    POTASSIUM 4.1 07/30/2013    CHLORIDE 102 08/18/2023    CHLORIDE 102 07/30/2013    CO2 22 08/18/2023    CO2 26 07/30/2013    BUN 19.8 08/18/2023    BUN 12 07/30/2013    CR 0.90 08/18/2023    CR 0.80 07/30/2013     (H) 08/18/2023    GLC 83 07/30/2013     COAGS:   Lab Results   Component Value Date    INR 1.02 07/30/2013     POC: No results found for: BGM, HCG, HCGS  HEPATIC:   Lab Results   Component Value Date    ALBUMIN 4.6 08/18/2023    PROTTOTAL 7.8 08/18/2023    ALT 28 08/18/2023    AST 24 08/18/2023    ALKPHOS 73 08/18/2023    BILITOTAL 0.2 08/18/2023     OTHER:   Lab Results   Component Value Date    RYAN 9.8 08/18/2023    LIPASE 86 07/30/2013       Anesthesia Plan    ASA Status:  2    NPO Status:  NPO Appropriate    Anesthesia Type: General.     - Airway: LMA   Induction: Intravenous, Propofol.   Maintenance: Balanced.        Consents    Anesthesia Plan(s) and associated risks, benefits, and realistic alternatives discussed. Questions answered and patient/representative(s) expressed understanding.     - Discussed: Risks, Benefits and Alternatives for BOTH SEDATION and the PROCEDURE were discussed     - Discussed with:  Patient      - Extended Intubation/Ventilatory Support Discussed: No.      - Patient is DNR/DNI Status: No     Use of blood products discussed: Yes.     - Discussed with: Patient.     - Consented: consented to blood products            Reason for refusal: other.     Postoperative Care    Pain management: IV analgesics, Multi-modal  analgesia, Peripheral nerve block (Single Shot).   PONV prophylaxis: Ondansetron (or other 5HT-3), Dexamethasone or Solumedrol, Background Propofol Infusion     Comments:    Other Comments: The risks and benefits of anesthesia, and the alternatives where applicable, have been discussed with the patient, and they wish to proceed.     Plan for popliteal and adductor canal nerve block for postoperative pain management. All risks and benefits were discussed, and patient verbalized understanding and wishes to proceed with nerve block. Paper consent obtained.              EDER Hartmann CRNA

## 2023-08-23 ENCOUNTER — SURGERY (OUTPATIENT)
Age: 28
End: 2023-08-23
Payer: COMMERCIAL

## 2023-08-23 ENCOUNTER — HOSPITAL ENCOUNTER (OUTPATIENT)
Dept: GENERAL RADIOLOGY | Facility: CLINIC | Age: 28
Discharge: HOME | End: 2023-08-23
Attending: ORTHOPAEDIC SURGERY | Admitting: ORTHOPAEDIC SURGERY
Payer: OTHER MISCELLANEOUS

## 2023-08-23 ENCOUNTER — HOSPITAL ENCOUNTER (OUTPATIENT)
Facility: CLINIC | Age: 28
Discharge: HOME | End: 2023-08-23
Attending: ORTHOPAEDIC SURGERY | Admitting: ORTHOPAEDIC SURGERY
Payer: OTHER MISCELLANEOUS

## 2023-08-23 VITALS
WEIGHT: 195 LBS | TEMPERATURE: 97.3 F | BODY MASS INDEX: 26.45 KG/M2 | HEART RATE: 73 BPM | DIASTOLIC BLOOD PRESSURE: 54 MMHG | OXYGEN SATURATION: 96 % | SYSTOLIC BLOOD PRESSURE: 121 MMHG | RESPIRATION RATE: 16 BRPM

## 2023-08-23 DIAGNOSIS — S82.51XS CLOSED DISPLACED FRACTURE OF MEDIAL MALLEOLUS OF RIGHT TIBIA, SEQUELA: ICD-10-CM

## 2023-08-23 DIAGNOSIS — S82.891A ANKLE FRACTURE, RIGHT, CLOSED, INITIAL ENCOUNTER: Primary | ICD-10-CM

## 2023-08-23 DIAGNOSIS — S82.51XA CLOSED DISPLACED FRACTURE OF MEDIAL MALLEOLUS OF RIGHT TIBIA, INITIAL ENCOUNTER: ICD-10-CM

## 2023-08-23 PROCEDURE — 370N000017 HC ANESTHESIA TECHNICAL FEE, PER MIN: Performed by: ORTHOPAEDIC SURGERY

## 2023-08-23 PROCEDURE — 250N000025 HC SEVOFLURANE, PER MIN: Performed by: ORTHOPAEDIC SURGERY

## 2023-08-23 PROCEDURE — 999N000141 HC STATISTIC PRE-PROCEDURE NURSING ASSESSMENT: Performed by: ORTHOPAEDIC SURGERY

## 2023-08-23 PROCEDURE — 258N000003 HC RX IP 258 OP 636: Performed by: NURSE ANESTHETIST, CERTIFIED REGISTERED

## 2023-08-23 PROCEDURE — 710N000012 HC RECOVERY PHASE 2, PER MINUTE: Performed by: ORTHOPAEDIC SURGERY

## 2023-08-23 PROCEDURE — 999N000179 XR SURGERY CARM FLUORO LESS THAN 5 MIN W STILLS

## 2023-08-23 PROCEDURE — C1713 ANCHOR/SCREW BN/BN,TIS/BN: HCPCS | Performed by: ORTHOPAEDIC SURGERY

## 2023-08-23 PROCEDURE — 250N000009 HC RX 250: Performed by: NURSE ANESTHETIST, CERTIFIED REGISTERED

## 2023-08-23 PROCEDURE — 27766 OPTX MEDIAL ANKLE FX: CPT | Mod: RT | Performed by: ORTHOPAEDIC SURGERY

## 2023-08-23 PROCEDURE — 250N000011 HC RX IP 250 OP 636: Mod: JZ | Performed by: NURSE ANESTHETIST, CERTIFIED REGISTERED

## 2023-08-23 PROCEDURE — 250N000011 HC RX IP 250 OP 636: Performed by: ORTHOPAEDIC SURGERY

## 2023-08-23 PROCEDURE — 710N000010 HC RECOVERY PHASE 1, LEVEL 2, PER MIN: Performed by: ORTHOPAEDIC SURGERY

## 2023-08-23 PROCEDURE — 360N000084 HC SURGERY LEVEL 4 W/ FLUORO, PER MIN: Performed by: ORTHOPAEDIC SURGERY

## 2023-08-23 PROCEDURE — 272N000001 HC OR GENERAL SUPPLY STERILE: Performed by: ORTHOPAEDIC SURGERY

## 2023-08-23 PROCEDURE — C1769 GUIDE WIRE: HCPCS | Performed by: ORTHOPAEDIC SURGERY

## 2023-08-23 PROCEDURE — 250N000013 HC RX MED GY IP 250 OP 250 PS 637: Performed by: ORTHOPAEDIC SURGERY

## 2023-08-23 DEVICE — IMP SCR SYN CAN 4.0X50MM SHORT SS 207.650: Type: IMPLANTABLE DEVICE | Site: ANKLE | Status: FUNCTIONAL

## 2023-08-23 RX ORDER — HYDROXYZINE HYDROCHLORIDE 25 MG/1
25 TABLET, FILM COATED ORAL EVERY 6 HOURS PRN
Status: DISCONTINUED | OUTPATIENT
Start: 2023-08-23 | End: 2023-08-23 | Stop reason: HOSPADM

## 2023-08-23 RX ORDER — ACETAMINOPHEN 325 MG/1
650 TABLET ORAL EVERY 4 HOURS PRN
Qty: 50 TABLET | Refills: 0 | Status: SHIPPED | OUTPATIENT
Start: 2023-08-23

## 2023-08-23 RX ORDER — OXYCODONE HYDROCHLORIDE 5 MG/1
5 TABLET ORAL
Status: DISCONTINUED | OUTPATIENT
Start: 2023-08-23 | End: 2023-08-23 | Stop reason: HOSPADM

## 2023-08-23 RX ORDER — DEXAMETHASONE SODIUM PHOSPHATE 10 MG/ML
INJECTION, SOLUTION INTRAMUSCULAR; INTRAVENOUS PRN
Status: DISCONTINUED | OUTPATIENT
Start: 2023-08-23 | End: 2023-08-23

## 2023-08-23 RX ORDER — HYDROMORPHONE HYDROCHLORIDE 1 MG/ML
0.5 INJECTION, SOLUTION INTRAMUSCULAR; INTRAVENOUS; SUBCUTANEOUS EVERY 5 MIN PRN
Status: DISCONTINUED | OUTPATIENT
Start: 2023-08-23 | End: 2023-08-23 | Stop reason: HOSPADM

## 2023-08-23 RX ORDER — ALBUTEROL SULFATE 0.83 MG/ML
2.5 SOLUTION RESPIRATORY (INHALATION) EVERY 4 HOURS PRN
Status: DISCONTINUED | OUTPATIENT
Start: 2023-08-23 | End: 2023-08-23 | Stop reason: HOSPADM

## 2023-08-23 RX ORDER — CELECOXIB 100 MG/1
400 CAPSULE ORAL ONCE
Status: COMPLETED | OUTPATIENT
Start: 2023-08-23 | End: 2023-08-23

## 2023-08-23 RX ORDER — HYDROXYZINE PAMOATE 25 MG/1
CAPSULE ORAL
Qty: 30 CAPSULE | Refills: 1 | Status: SHIPPED | OUTPATIENT
Start: 2023-08-23 | End: 2023-11-08

## 2023-08-23 RX ORDER — BUPIVACAINE HYDROCHLORIDE AND EPINEPHRINE 2.5; 5 MG/ML; UG/ML
INJECTION, SOLUTION INFILTRATION; PERINEURAL PRN
Status: DISCONTINUED | OUTPATIENT
Start: 2023-08-23 | End: 2023-08-23

## 2023-08-23 RX ORDER — CEFAZOLIN SODIUM/WATER 2 G/20 ML
2 SYRINGE (ML) INTRAVENOUS SEE ADMIN INSTRUCTIONS
Status: DISCONTINUED | OUTPATIENT
Start: 2023-08-23 | End: 2023-08-23 | Stop reason: HOSPADM

## 2023-08-23 RX ORDER — OXYCODONE HYDROCHLORIDE 5 MG/1
5 TABLET ORAL 4 TIMES DAILY PRN
Qty: 20 TABLET | Refills: 0 | Status: SHIPPED | OUTPATIENT
Start: 2023-08-23 | End: 2023-11-08

## 2023-08-23 RX ORDER — FENTANYL CITRATE 50 UG/ML
25 INJECTION, SOLUTION INTRAMUSCULAR; INTRAVENOUS
Status: DISCONTINUED | OUTPATIENT
Start: 2023-08-23 | End: 2023-08-23 | Stop reason: HOSPADM

## 2023-08-23 RX ORDER — NAPROXEN 500 MG/1
500 TABLET ORAL EVERY 12 HOURS PRN
Qty: 20 TABLET | Refills: 0 | Status: SHIPPED | OUTPATIENT
Start: 2023-08-23 | End: 2023-11-08

## 2023-08-23 RX ORDER — ONDANSETRON 2 MG/ML
4 INJECTION INTRAMUSCULAR; INTRAVENOUS EVERY 30 MIN PRN
Status: DISCONTINUED | OUTPATIENT
Start: 2023-08-23 | End: 2023-08-23 | Stop reason: HOSPADM

## 2023-08-23 RX ORDER — ACETAMINOPHEN 325 MG/1
975 TABLET ORAL EVERY 6 HOURS PRN
Status: DISCONTINUED | OUTPATIENT
Start: 2023-08-23 | End: 2023-08-23 | Stop reason: HOSPADM

## 2023-08-23 RX ORDER — HALOPERIDOL 5 MG/ML
1 INJECTION INTRAMUSCULAR
Status: DISCONTINUED | OUTPATIENT
Start: 2023-08-23 | End: 2023-08-23 | Stop reason: HOSPADM

## 2023-08-23 RX ORDER — ACETAMINOPHEN 325 MG/1
975 TABLET ORAL ONCE
Status: COMPLETED | OUTPATIENT
Start: 2023-08-23 | End: 2023-08-23

## 2023-08-23 RX ORDER — DIMENHYDRINATE 50 MG/ML
25 INJECTION, SOLUTION INTRAMUSCULAR; INTRAVENOUS
Status: DISCONTINUED | OUTPATIENT
Start: 2023-08-23 | End: 2023-08-23 | Stop reason: HOSPADM

## 2023-08-23 RX ORDER — AMOXICILLIN 250 MG
1-2 CAPSULE ORAL 2 TIMES DAILY
Qty: 30 TABLET | Refills: 0 | Status: SHIPPED | OUTPATIENT
Start: 2023-08-23 | End: 2023-11-08

## 2023-08-23 RX ORDER — LIDOCAINE HYDROCHLORIDE 10 MG/ML
INJECTION, SOLUTION INFILTRATION; PERINEURAL PRN
Status: DISCONTINUED | OUTPATIENT
Start: 2023-08-23 | End: 2023-08-23

## 2023-08-23 RX ORDER — SODIUM CHLORIDE, SODIUM LACTATE, POTASSIUM CHLORIDE, CALCIUM CHLORIDE 600; 310; 30; 20 MG/100ML; MG/100ML; MG/100ML; MG/100ML
INJECTION, SOLUTION INTRAVENOUS CONTINUOUS
Status: DISCONTINUED | OUTPATIENT
Start: 2023-08-23 | End: 2023-08-23 | Stop reason: HOSPADM

## 2023-08-23 RX ORDER — HYDROMORPHONE HYDROCHLORIDE 1 MG/ML
0.2 INJECTION, SOLUTION INTRAMUSCULAR; INTRAVENOUS; SUBCUTANEOUS EVERY 5 MIN PRN
Status: DISCONTINUED | OUTPATIENT
Start: 2023-08-23 | End: 2023-08-23 | Stop reason: HOSPADM

## 2023-08-23 RX ORDER — FENTANYL CITRATE 50 UG/ML
25 INJECTION, SOLUTION INTRAMUSCULAR; INTRAVENOUS EVERY 5 MIN PRN
Status: DISCONTINUED | OUTPATIENT
Start: 2023-08-23 | End: 2023-08-23 | Stop reason: HOSPADM

## 2023-08-23 RX ORDER — PROPOFOL 10 MG/ML
INJECTION, EMULSION INTRAVENOUS CONTINUOUS PRN
Status: DISCONTINUED | OUTPATIENT
Start: 2023-08-23 | End: 2023-08-23

## 2023-08-23 RX ORDER — CEFAZOLIN SODIUM/WATER 2 G/20 ML
2 SYRINGE (ML) INTRAVENOUS
Status: COMPLETED | OUTPATIENT
Start: 2023-08-23 | End: 2023-08-23

## 2023-08-23 RX ORDER — OXYCODONE HYDROCHLORIDE 5 MG/1
10 TABLET ORAL
Status: DISCONTINUED | OUTPATIENT
Start: 2023-08-23 | End: 2023-08-23 | Stop reason: HOSPADM

## 2023-08-23 RX ORDER — ONDANSETRON 4 MG/1
4 TABLET, ORALLY DISINTEGRATING ORAL EVERY 30 MIN PRN
Status: DISCONTINUED | OUTPATIENT
Start: 2023-08-23 | End: 2023-08-23 | Stop reason: HOSPADM

## 2023-08-23 RX ORDER — PROPOFOL 10 MG/ML
INJECTION, EMULSION INTRAVENOUS PRN
Status: DISCONTINUED | OUTPATIENT
Start: 2023-08-23 | End: 2023-08-23

## 2023-08-23 RX ORDER — OXYCODONE HYDROCHLORIDE 5 MG/1
10 TABLET ORAL
Status: CANCELLED | OUTPATIENT
Start: 2023-08-23

## 2023-08-23 RX ORDER — FENTANYL CITRATE 50 UG/ML
INJECTION, SOLUTION INTRAMUSCULAR; INTRAVENOUS PRN
Status: DISCONTINUED | OUTPATIENT
Start: 2023-08-23 | End: 2023-08-23

## 2023-08-23 RX ORDER — FENTANYL CITRATE 50 UG/ML
50 INJECTION, SOLUTION INTRAMUSCULAR; INTRAVENOUS EVERY 5 MIN PRN
Status: DISCONTINUED | OUTPATIENT
Start: 2023-08-23 | End: 2023-08-23 | Stop reason: HOSPADM

## 2023-08-23 RX ORDER — ACETAMINOPHEN 325 MG/1
650 TABLET ORAL
Status: CANCELLED | OUTPATIENT
Start: 2023-08-23

## 2023-08-23 RX ADMIN — BUPIVACAINE HYDROCHLORIDE AND EPINEPHRINE BITARTRATE 20 ML: 2.5; .005 INJECTION, SOLUTION INFILTRATION; PERINEURAL at 16:00

## 2023-08-23 RX ADMIN — MIDAZOLAM 1 MG: 1 INJECTION INTRAMUSCULAR; INTRAVENOUS at 15:50

## 2023-08-23 RX ADMIN — LIDOCAINE HYDROCHLORIDE 50 MG: 10 INJECTION, SOLUTION INFILTRATION; PERINEURAL at 16:21

## 2023-08-23 RX ADMIN — SODIUM CHLORIDE, POTASSIUM CHLORIDE, SODIUM LACTATE AND CALCIUM CHLORIDE: 600; 310; 30; 20 INJECTION, SOLUTION INTRAVENOUS at 15:04

## 2023-08-23 RX ADMIN — PROPOFOL 200 MCG/KG/MIN: 10 INJECTION, EMULSION INTRAVENOUS at 16:21

## 2023-08-23 RX ADMIN — DEXAMETHASONE SODIUM PHOSPHATE 5 MG: 10 INJECTION, SOLUTION INTRAMUSCULAR; INTRAVENOUS at 16:00

## 2023-08-23 RX ADMIN — PROPOFOL 300 MG: 10 INJECTION, EMULSION INTRAVENOUS at 16:21

## 2023-08-23 RX ADMIN — DEXAMETHASONE SODIUM PHOSPHATE 5 MG: 10 INJECTION, SOLUTION INTRAMUSCULAR; INTRAVENOUS at 16:12

## 2023-08-23 RX ADMIN — MIDAZOLAM 1 MG: 1 INJECTION INTRAMUSCULAR; INTRAVENOUS at 15:52

## 2023-08-23 RX ADMIN — Medication 2 G: at 16:10

## 2023-08-23 RX ADMIN — FENTANYL CITRATE 50 MCG: 50 INJECTION, SOLUTION INTRAMUSCULAR; INTRAVENOUS at 15:50

## 2023-08-23 RX ADMIN — BUPIVACAINE HYDROCHLORIDE AND EPINEPHRINE BITARTRATE 20 ML: 2.5; .005 INJECTION, SOLUTION INFILTRATION; PERINEURAL at 16:12

## 2023-08-23 RX ADMIN — FENTANYL CITRATE 50 MCG: 50 INJECTION, SOLUTION INTRAMUSCULAR; INTRAVENOUS at 15:52

## 2023-08-23 RX ADMIN — ACETAMINOPHEN 975 MG: 325 TABLET, FILM COATED ORAL at 14:38

## 2023-08-23 RX ADMIN — LIDOCAINE HYDROCHLORIDE 0.1 ML: 10 INJECTION, SOLUTION EPIDURAL; INFILTRATION; INTRACAUDAL; PERINEURAL at 15:04

## 2023-08-23 RX ADMIN — CELECOXIB 400 MG: 100 CAPSULE ORAL at 14:38

## 2023-08-23 ASSESSMENT — ACTIVITIES OF DAILY LIVING (ADL)
ADLS_ACUITY_SCORE: 35

## 2023-08-23 NOTE — DISCHARGE INSTRUCTIONS
[facility] Same-Day Surgery   Adult Discharge Orders & Instructions     For 24 hours after surgery    Get plenty of rest.  A responsible adult must stay with you for at least 24 hours after you leave the hospital.   Do not drive or use heavy equipment.  If you have weakness or tingling, don't drive or use heavy equipment until this feeling goes away.  Do not drink alcohol.  Avoid strenuous or risky activities.  Ask for help when climbing stairs.   You may feel lightheaded.  If so, sit for a few minutes before standing.  Have someone help you get up.   You may have a slight fever. Call the doctor if your fever is over 100 F (37.7 C) (taken under the tongue) or lasts longer than 24 hours.  You may have a dry mouth, a sore throat, muscle aches or trouble sleeping.  These should go away after 24 hours.  Do not make important or legal decisions.  We don t expect you to have any problems from the surgery or treatment you had today. Just in case, here s what to do if you have pain, upset stomach (nausea), bleeding or infection:  Pain:  Take medicines your doctor has prescribed or over-the-counter medicine they have suggested. Resting and using ice packs can help, too. For surgery on an arm or leg, raise it on a pillow to ease swelling. Call your doctor if these methods don t work.  Copyright Dandre Baca, Licensed under [employer]  Upset stomach (nausea):  Take anti-nausea medicine approved by your doctor. Drink clear liquids like apple juice, ginger ale, broth or 7-Up. Be sure to drink enough fluids. Rest can help, too. Move to normal foods when you re ready.   Bleeding:  In the first 24 hours, you may see a little blood on your dressing, about the size of a quarter. You don t need to worry about this much blood, but if the blood spot keeps getting bigger:  Put pressure on the wound if you can, AND  Call your doctor.  Copyright [employer], Licensed under [employer]  Fever/Infection: Please call  your doctor if you have any of these signs:  Redness  Swelling  Wound feels warm  Pain gets worse  Bad-smelling fluid leaks from wound  Fever or chills  Call your doctor for any of the followin.  It has been over 8 to 10 hours since surgery and you are still not able to urinate (pass water).    2.  Headache for over 24 hours.      Nurse advice line: [phone]  Post Operative Instructions following a Femoral, Popliteal or Ankle Block  Regional anesthesia is injected into or around the nerves to anesthetize the area supplied by that set of nerves.  It is a type of local anesthetic used to numb a specific area, and is used to control pain and decrease the need for narcotics following surgery.  Types of Regional Blocks:  Femoral: An anesthetic medication injected into the groin area of the operative leg of the patient having knee surgery.  Popliteal: An anesthetic medication injection into the back of the knee of the operative leg of the patient having foot surgery.  Ankle: An anesthetic medication injected into the ankle of the operative leg of a patient having toe surgery.  Procedure:  The type of anesthesia your provide used to numb your leg will usually not wear off for 4-6 hours, but may last as long 12 hours or more.  You should be careful during that period, since it is possible to injure your leg without being aware of the injury.  While your leg is numb, you should:  Use crutches (no weight bearing until the block is completely worn off)  Avoid striking or bumping your leg  Avoid extreme hot or cold  Discomfort:  You will have a tingling and prickly sensation in your leg as the feeling begins to return; you can also expect some discomfort.  The amount of discomfort is unpredictable but if you have more pain than can be controlled with pain medication you may have received, you should notify your physician.  We strongly recommend starting your pain medication at bedtime if you haven t already done so.   This is in the event that the block wears off while you are asleep.

## 2023-08-23 NOTE — ANESTHESIA CARE TRANSFER NOTE
Patient: Marcel Quan    Procedure: Procedure(s):  OPEN REDUCTION INTERNAL FIXATION, FRACTURE, RIGHT ANKLE       Diagnosis: Closed displaced fracture of medial malleolus of right tibia, sequela [S82.51XS]  Diagnosis Additional Information: No value filed.    Anesthesia Type:   General     Note:    Oropharynx: oropharynx clear of all foreign objects and spontaneously breathing  Level of Consciousness: drowsy  Oxygen Supplementation: face mask    Independent Airway: airway patency satisfactory and stable  Dentition: dentition unchanged  Vital Signs Stable: post-procedure vital signs reviewed and stable  Report to RN Given: handoff report given  Patient transferred to: PACU    Handoff Report: Identifed the Patient, Identified the Reponsible Provider, Reviewed the pertinent medical history, Discussed the surgical course, Reviewed Intra-OP anesthesia mangement and issues during anesthesia, Set expectations for post-procedure period and Allowed opportunity for questions and acknowledgement of understanding      Vitals:  Vitals Value Taken Time   /63 08/23/23 1820   Temp 96.8  F (36  C) 08/23/23 1821   Pulse 67 08/23/23 1820   Resp 10 08/23/23 1820   SpO2 98 % 08/23/23 1821   Vitals shown include unvalidated device data.    Electronically Signed By: EDER Hartmann CRNA  August 23, 2023  6:26 PM

## 2023-08-23 NOTE — ANESTHESIA POSTPROCEDURE EVALUATION
Patient: Marcel Quan    Procedure: Procedure(s):  OPEN REDUCTION INTERNAL FIXATION, FRACTURE, RIGHT ANKLE       Anesthesia Type:  General    Note:  Disposition: Outpatient   Postop Pain Control: Uneventful            Sign Out: Well controlled pain   PONV: No   Neuro/Psych: Uneventful            Sign Out: Acceptable/Baseline neuro status   Airway/Respiratory: Uneventful            Sign Out: Acceptable/Baseline resp. status   CV/Hemodynamics: Uneventful            Sign Out: Acceptable CV status   Other NRE: NONE   DID A NON-ROUTINE EVENT OCCUR? No    Event details/Postop Comments:  Pt was happy with anesthesia care.  No complications.  I will follow up with the pt if needed.           Last vitals:  Vitals Value Taken Time   /63 08/23/23 1820   Temp 96.8  F (36  C) 08/23/23 1821   Pulse 67 08/23/23 1820   Resp 10 08/23/23 1820   SpO2 98 % 08/23/23 1821   Vitals shown include unvalidated device data.    Electronically Signed By: EDER Hartmann CRNA  August 23, 2023  6:26 PM

## 2023-08-23 NOTE — ANESTHESIA PROCEDURE NOTES
Airway       Patient location during procedure: OR  Staff -        CRNA: Saida Romero APRN CRNA       Performed By: CRNA  Consent for Airway        Urgency: elective  Indications and Patient Condition       Indications for airway management: kristin-procedural       Induction type:intravenous       Mask difficulty assessment: 1 - vent by mask    Final Airway Details       Final airway type: supraglottic airway    Supraglottic Airway Details        Type: LMA       Brand: LMA Unique       LMA size: 5    Post intubation assessment        Placement verified by: capnometry, equal breath sounds and chest rise        Number of attempts at approach: 1       Number of other approaches attempted: 0       Secured with: silk tape       Ease of procedure: easy       Dentition: Intact and Unchanged    Additional Comments       Atraumatic. No apparent complications.

## 2023-08-23 NOTE — OP NOTE
8/23/2023     OPERATIVE NOTE:    PRE-OP DIAGNOSIS:  right ankle fracture      POST-OP DIAGNOSIS:  right ankle fracture     PROCEDURE:  open reduction and internal fixation right ankle fracture     SURGEON: Tyrone    ASSISTANT(S):  CANDACE John    ANESTHESIA:  general + regional block    Indications:  Marcel Quan is a 28 year old male with a comminuted right medial malleolar  fracture of the right ankle.  There also are tiny fracture fragments from a lateral talar dome injury.  It was felt that surgical intervention was most appropriate. We had to wait a couple of weeks to allow the swelling to go down, and for a blister medially to resolve. Informed consent was obtained for the procedure.    Procedure:  In preop, popliteal and Adductor canal blocks were performed.  The leg marked.   Patient was taken to the operating room placed on the operating table in the supine position where general anesthesia was induced. A tourniquet was placed around the proximal thigh.  The limb was prepped and draped in usual sterile fashion. Pause for site confirmation was performed.    A slightly curved incision was made over the medial malleolus. The incision curved a bit anterior to allow access to the ankle joint. Spreading technique was used to help identify the saphenous neurovascular structure which was retracted.   The fracture site was identified.  The main medial malleolar fragment was flipped more than 90 degrees. This piece was retracted. Debris removed from the fracture. There were 2 very small fragments that were loose, and removed.  There was a slightly larger posterior fragment, about 4mm in size located posterior to the posterior tibial tendon. I carefully teased this out from the depths of the wound and removed it, since there was no way to repair it. Then I placed a retractor along the anterior capsule and used saline with a bulb syringe to wash out any loose debris from the lateral gutter. There were 2 tiny  fragments that came out.     I then placed a distally threaded guidepin in the anterior third of the medial malleolar fragment and use this as a joystick to maneuver the fracture back into place. Once in place, we clamped the fracture.  I passed a pin across the fracture site and checked the position of the pin by fluoroscopy.  A second pin was placed more posteriorly and parallel to the first pin. Position of the pins checked by fluoroscopy. I felt that both were headed too anteriorly, so I adjusted both. The depth of the pins were measured, and the distal cortex was drilled with the cannulated drill. Then partially threaded 4.0 cannulated screws were placed over the guidewires. These had very good bite with good compression of the fracture.  Final fluoroscopic pictures were taken.    The wound(s) were irrigated.  The posterior tibial tendon seemed to move freely with range of motion. The tourniquet was deflated and minor bleeders were cauterized. The saphenous vein was intact.   The wound(s)  were closed with a few interrupted 2-0 Vicryl sutures in the subcutaneous layer and interrupted 3-0 nylon sutures placed in a vertical mattress fashion and the skin using Allgower knots.  Sterile dressings were applied, and a well padded Avery Duenas type splint was applied.  The patient was awakened and transferred to the hospital cart and to the recovery area in stable condition.    PLAN: nonweightbearing x 6 weeks.  Start range of motion in 2  weeks.   Strict elevation for the next 3 days, and most of the time for the next 10 days.  Follow up 2 weeks for splint off, sutures out. Start range of motion.      GEOVANNY Hansen MD  Dept. Orthopedic Surgery  [facility]

## 2023-09-02 ENCOUNTER — NURSE TRIAGE (OUTPATIENT)
Dept: NURSING | Facility: CLINIC | Age: 28
End: 2023-09-02
Payer: COMMERCIAL

## 2023-09-02 NOTE — TELEPHONE ENCOUNTER
Patient reports that he recently had surgery on his right ankle on August 23rd and has a splint in place.     He is wanting to know if he can remove the splint to take a shower. Patient states he is suppose to keep the splint on until his next follow up appointment.    Recommended to keep splint on and apply a garbage bag over the splint, secure at top to ensure that the splint does not get wet. Patient verbalized understanding.    Olimpia Villagran RN on 9/2/2023 at 2:26 PM   Reason for Disposition   General information question, no triage required and triager able to answer question    Protocols used: Information Only Call - No Triage-A-[place]

## 2023-09-05 NOTE — ADDENDUM NOTE
Addendum  created 09/05/23 1503 by Saida Romero APRN CRNA    Child order released for a procedure order, Clinical Note Signed, Intraprocedure Blocks edited, SmartForm saved

## 2023-09-05 NOTE — ANESTHESIA PROCEDURE NOTES
Adductor canal Procedure Note    Pre-Procedure   Staff -        CRNA: Saida Romero APRN CRNA       Performed By: CRNA       Location: post-op       Procedure Start/Stop Times: 8/23/2023 4:04 PM and 9/5/2023 4:12 PM       Pre-Anesthestic Checklist: patient identified, IV checked, site marked, risks and benefits discussed, informed consent, monitors and equipment checked, pre-op evaluation, at physician/surgeon's request and post-op pain management  Timeout:       Correct Patient: Yes        Correct Procedure: Yes        Correct Site: Yes        Correct Position: Yes        Correct Laterality: Yes        Site Marked: Yes  Procedure Documentation  Procedure: Adductor canal       Diagnosis: ORIF ANKLE       Laterality: right       Patient Position: supine       Patient Prep/Sterile Barriers: sterile gloves, mask       Skin prep: Chloraprep       Local skin infiltrated with 3 mL of 2% lidocaine.        Needle Type: insulated       Needle Gauge: 21.        Needle Length (millimeters): 100        Ultrasound guided       1. Ultrasound was used to identify targeted nerve, plexus, vascular marker, or fascial plane and place a needle adjacent to it in real-time.       2. Ultrasound was used to visualize the spread of anesthetic in close proximity to the above referenced structure.       3. A permanent image is entered into the patient's record.    Assessment/Narrative         The placement was negative for: blood aspirated, painful injection and site bleeding       Paresthesias: No.       Test dose of mL at.         Test dose negative, 3 minutes after injection, for signs of intravascular, subdural, or intrathecal injection.       Bolus given via needle..        Secured via.        Insertion/Infusion Method: Single Shot       Complications: none       Injection made incrementally with aspirations every 5 mL.    Medication(s) Administered   Medication Administration Time: 8/23/2023 4:04 PM     Comments:  Placed with  "the assistance of an RN.  Good visualization of the femoral artery and the local spread lateral to the artery.  No HR increase with injections and no other complications noted.  I will follow up with the pt if needed.      FOR [facility] ([facility]) ONLY:   Pain Team Contact information: please page the Pain Team Via [employer]. Search \"Pain\". During daytime hours, please page the attending first. At night please page the resident first.      "

## 2023-09-05 NOTE — ANESTHESIA PROCEDURE NOTES
Popliteal Procedure Note    Pre-Procedure   Staff -        CRNA: Saida Romero APRN CRNA       Performed By: CRNA       Location: OR       Procedure Start/Stop Times: 8/23/2023 3:50 PM and 8/23/2023 4:00 PM       Pre-Anesthestic Checklist: patient identified, IV checked, site marked, risks and benefits discussed, informed consent, monitors and equipment checked, pre-op evaluation, at physician/surgeon's request and post-op pain management  Timeout:       Correct Patient: Yes        Correct Procedure: Yes        Correct Site: Yes        Correct Position: Yes        Correct Laterality: Yes        Site Marked: Yes  Procedure Documentation  Procedure: Popliteal       Diagnosis: ORIF ANKLE       Laterality: right       Patient Position: prone       Patient Prep/Sterile Barriers: sterile gloves, mask       Skin prep: Chloraprep       Needle Type: insulated       Needle Gauge: 21.        Needle Length (millimeters): 100        Ultrasound guided       1. Ultrasound was used to identify targeted nerve, plexus, vascular marker, or fascial plane and place a needle adjacent to it in real-time.       2. Ultrasound was used to visualize the spread of anesthetic in close proximity to the above referenced structure.       3. A permanent image is entered into the patient's record.       Nerve Stim: Initial Level 0.05 mA.  Lowest motor response mA.    Assessment/Narrative         The placement was negative for: blood aspirated, painful injection and site bleeding       Paresthesias: No.       Test dose of mL at.         Test dose negative, 3 minutes after injection, for signs of intravascular, subdural, or intrathecal injection.       Bolus given via needle..        Secured via.        Insertion/Infusion Method: Single Shot       Complications: none       Injection made incrementally with aspirations every 5 mL.    Medication(s) Administered   Medication Administration Time: 8/23/2023 3:50 PM     Comments:  Pt tolerated the  "procedure well.  No complications were noted.  Good visualization of the merge point of the common peroneal and Tibial nerves was noted.  Local was visualized surrounding this junction.  I will follow up with the pt if needed.      FOR [facility] ([facility]) ONLY:   Pain Team Contact information: please page the Pain Team Via [employer]. Search \"Pain\". During daytime hours, please page the attending first. At night please page the resident first.      "

## 2023-09-07 NOTE — PROGRESS NOTES
SUBJECTIVE:      Marcel Quan is a 28 year old year old male who is here today for follow up ORIF of his right ankle ankle fracture on 8/23/23.  Has had little pain. No fevers or chills.    OBJECTIVE:     /79   Pulse 75   SpO2 96%    Patient appears to be alert and in no apparent distress.  Skin intact.  Wound: healing well   Swelling:mild  Neurovascularly Intact.    ROM: limited    ASSESSMENT:     Status post open reduction and internal fixation medial malleolus fracture   Doing very well    PLAN:    Sutures removed today  Weight Bearing: non weight bearing on affected side x 2 more weeks, then start weight bearing as tolerated in the boot.  Rolling knee cart? no  Use fracture boot for foot positioning or AFO  Rehab:  home exercise program for range of motion.  Wound management:  do not soak wound for at least another 10 days   Vitamin e to scar     Medications: none needed  ASA 325mg daily x 2 more weeks     Work: work note written    Return to clinic in 2 week(s).   Xrays of the ankle at that time.  Will determine weight bearing at that point.     GEOVANNY Hansen MD  Dept. Orthopedic Surgery  [facility]

## 2023-09-13 ENCOUNTER — OFFICE VISIT (OUTPATIENT)
Dept: ORTHOPEDICS | Facility: CLINIC | Age: 28
End: 2023-09-13
Payer: OTHER MISCELLANEOUS

## 2023-09-13 VITALS — OXYGEN SATURATION: 96 % | HEART RATE: 75 BPM | SYSTOLIC BLOOD PRESSURE: 138 MMHG | DIASTOLIC BLOOD PRESSURE: 79 MMHG

## 2023-09-13 DIAGNOSIS — S82.891D CLOSED FRACTURE OF RIGHT ANKLE WITH ROUTINE HEALING, SUBSEQUENT ENCOUNTER: Primary | ICD-10-CM

## 2023-09-13 PROCEDURE — 99213 OFFICE O/P EST LOW 20 MIN: CPT | Performed by: ORTHOPAEDIC SURGERY

## 2023-09-13 ASSESSMENT — PAIN SCALES - GENERAL: PAINLEVEL: MILD PAIN (3)

## 2023-09-13 NOTE — PATIENT INSTRUCTIONS
PLAN:    Sutures removed today  Weight Bearing: non weight bearing on affected side x 2 more weeks, then start weight bearing as tolerated in the boot.    Use fracture boot for foot positioning or AFO  Rehab:  home exercise program for range of motion.  Wound management:  do not soak wound for at least another 10 days   Vitamin e to scar      Medications: none needed  ASA 325mg daily     Work: work note written     Return to clinic in 2 week(s).   Xrays of the ankle at that time.

## 2023-09-13 NOTE — LETTER
9/13/2023         RE: Marcel Quan  [address]  [facility] [address]        Dear Colleague,    Thank you for referring your patient, Marcel Quan, to the [facility]. Please see a copy of my visit note below.    SUBJECTIVE:      Marcel Quan is a 28 year old year old male who is here today for follow up ORIF of his right ankle ankle fracture on 8/23/23.  Has had little pain. No fevers or chills.    OBJECTIVE:     /79   Pulse 75   SpO2 96%    Patient appears to be alert and in no apparent distress.  Skin intact.  Wound: healing well   Swelling:mild  Neurovascularly Intact.    ROM: limited    ASSESSMENT:     Status post open reduction and internal fixation medial malleolus fracture   Doing very well    PLAN:    Sutures removed today  Weight Bearing: non weight bearing on affected side x 2 more weeks, then start weight bearing as tolerated in the boot.  Rolling knee cart? no  Use fracture boot for foot positioning or AFO  Rehab:  home exercise program for range of motion.  Wound management:  do not soak wound for at least another 10 days   Vitamin e to scar     Medications: none needed  ASA 325mg daily x 2 more weeks     Work: work note written    Return to clinic in 2 week(s).   Xrays of the ankle at that time.  Will determine weight bearing at that point.     GEOVANNY Hansen MD  Dept. Orthopedic Surgery  [facility]      Again, thank you for allowing me to participate in the care of your patient.        Sincerely,        Jonah Hansen MD

## 2023-09-22 NOTE — PROGRESS NOTES
SUBJECTIVE:  Marcel Quan is a 28 year old male who is here today for follow up of his ORIF ankle fracture on 8/23/23.    Has had very little pain. No fevers or chills.   Has  been doing range of motion home exercise program  Has  been compliant with weight bearing restrictions.    OBJECTIVE:   /73 (BP Location: Left arm, Patient Position: Sitting, Cuff Size: Adult Regular)   Pulse 84   Ht 1.829 m (6')   Wt 88.5 kg (195 lb)   SpO2 97%   BMI 26.45 kg/m     Patient appears to be alert and in no apparent distress.  Skin: wound healed.    Neurovascularly Intact.    ROM: dorsiflexion to zero  Tenderness: no over the fracture site(s).    X-rays today:  Show the fracture maintaining position.  Mortice looks good .  Fracture healing    ASSESSMENT:     ICD-10-CM    1. Closed fracture of right ankle with routine healing, subsequent encounter  S82.891D XR Ankle Right G/E 3 Views      2. History of ankle surgery  Z98.890 XR Ankle Right G/E 3 Views      Medial malleolus fracture      Doing well    PLAN:   Weight Bearing: OK to start weight bearing as tolerated in fracture boot.  Can discontinue boot at night.  Driving ok   Scar management discussed.  Rehab: continue home exercise program.  physical therapy ordered.  Work: work note written  Medications:  none    Return to clinic in 4 week(s).  Xrays of the ankle at that time.      GEOVANNY Hansen MD  Dept. Orthopedic Surgery  [facility]

## 2023-09-27 ENCOUNTER — OFFICE VISIT (OUTPATIENT)
Dept: ORTHOPEDICS | Facility: CLINIC | Age: 28
End: 2023-09-27
Payer: OTHER MISCELLANEOUS

## 2023-09-27 ENCOUNTER — ANCILLARY PROCEDURE (OUTPATIENT)
Dept: GENERAL RADIOLOGY | Facility: CLINIC | Age: 28
End: 2023-09-27
Attending: ORTHOPAEDIC SURGERY
Payer: COMMERCIAL

## 2023-09-27 VITALS
HEIGHT: 72 IN | SYSTOLIC BLOOD PRESSURE: 134 MMHG | OXYGEN SATURATION: 97 % | WEIGHT: 195 LBS | DIASTOLIC BLOOD PRESSURE: 73 MMHG | HEART RATE: 84 BPM | BODY MASS INDEX: 26.41 KG/M2

## 2023-09-27 DIAGNOSIS — Z98.890 HISTORY OF ANKLE SURGERY: ICD-10-CM

## 2023-09-27 DIAGNOSIS — S82.891D CLOSED FRACTURE OF RIGHT ANKLE WITH ROUTINE HEALING, SUBSEQUENT ENCOUNTER: ICD-10-CM

## 2023-09-27 DIAGNOSIS — S82.891D CLOSED FRACTURE OF RIGHT ANKLE WITH ROUTINE HEALING, SUBSEQUENT ENCOUNTER: Primary | ICD-10-CM

## 2023-09-27 PROCEDURE — 73610 X-RAY EXAM OF ANKLE: CPT | Mod: TC | Performed by: RADIOLOGY

## 2023-09-27 PROCEDURE — 99024 POSTOP FOLLOW-UP VISIT: CPT | Performed by: ORTHOPAEDIC SURGERY

## 2023-09-27 ASSESSMENT — PAIN SCALES - GENERAL: PAINLEVEL: NO PAIN (0)

## 2023-09-27 NOTE — LETTER
September 27, 2023      Marcel Quan  [address] [address] [place] [address] [address]  [facility] [address]        To Whom It May Concern:    Marcel Quan was seen in our clinic 09-27-23. Pt to remain not working for one month. If you have questions or concerns please call our office.    Sincerely,        Jonah Hansen MD

## 2023-09-27 NOTE — LETTER
9/27/2023         RE: Marcel Quan  [address]  [facility] [address]        Dear Colleague,    Thank you for referring your patient, Marcel Quan, to the [facility]. Please see a copy of my visit note below.    SUBJECTIVE:  Marcel Quan is a 28 year old male who is here today for follow up of his ORIF ankle fracture on 8/23/23.    Has had very little pain. No fevers or chills.   Has  been doing range of motion home exercise program  Has  been compliant with weight bearing restrictions.    OBJECTIVE:   /73 (BP Location: Left arm, Patient Position: Sitting, Cuff Size: Adult Regular)   Pulse 84   Ht 1.829 m (6')   Wt 88.5 kg (195 lb)   SpO2 97%   BMI 26.45 kg/m     Patient appears to be alert and in no apparent distress.  Skin: wound healed.    Neurovascularly Intact.    ROM: dorsiflexion to zero  Tenderness: no over the fracture site(s).    X-rays today:  Show the fracture maintaining position.  Mortice looks good .  Fracture healing    ASSESSMENT:     ICD-10-CM    1. Closed fracture of right ankle with routine healing, subsequent encounter  S82.891D XR Ankle Right G/E 3 Views      2. History of ankle surgery  Z98.890 XR Ankle Right G/E 3 Views      Medial malleolus fracture      Doing well    PLAN:   Weight Bearing: OK to start weight bearing as tolerated in fracture boot.  Can discontinue boot at night.  Driving ok   Scar management discussed.  Rehab: continue home exercise program.  physical therapy ordered.  Work: work note written  Medications:  none    Return to clinic in 4 week(s).  Xrays of the ankle at that time.      GEOVANNY Hansen MD  Dept. Orthopedic Surgery  [facility]      Again, thank you for allowing me to participate in the care of your patient.        Sincerely,        Jonah Hansen MD

## 2023-10-06 ENCOUNTER — THERAPY VISIT (OUTPATIENT)
Dept: PHYSICAL THERAPY | Facility: CLINIC | Age: 28
End: 2023-10-06
Attending: ORTHOPAEDIC SURGERY
Payer: OTHER MISCELLANEOUS

## 2023-10-06 DIAGNOSIS — S82.891D CLOSED FRACTURE OF RIGHT ANKLE WITH ROUTINE HEALING, SUBSEQUENT ENCOUNTER: ICD-10-CM

## 2023-10-06 PROCEDURE — 97140 MANUAL THERAPY 1/> REGIONS: CPT | Mod: GP

## 2023-10-06 PROCEDURE — 97161 PT EVAL LOW COMPLEX 20 MIN: CPT | Mod: GP

## 2023-10-06 PROCEDURE — 97110 THERAPEUTIC EXERCISES: CPT | Mod: GP

## 2023-10-06 NOTE — PROGRESS NOTES
PHYSICAL THERAPY EVALUATION  Type of Visit: Evaluation    See electronic medical record for Abuse and Falls Screening details.    Subjective       Presenting condition or subjective complaint: right ankle fracture and had surgery  Date of onset: 08/23/23    Relevant medical history:     Dates & types of surgery: 8/23/2023 Open reduction fixation right ankle    Patient is a 29 YO male presenting in a post-op boot with right ankle pain s/p right medial malleolar fracture repair on 8/23/2023. Patient states he fractured his ankle when he fell about 6ft off a ladder a work. Patient reports right ankle pain with some radiating pain into his anterior calf and knee pain. He states he has occasional sharp shooting pains that started about a month after surgery. Patient was non-weightbearing until 9/25/2023. He is able to sleep through the night without interruptions from pain. He is not currently taking any medications from pain. Patient has been perform ankle pumps on a regular basis when not wearing his boot.     Prior diagnostic imaging/testing results: CT scan; X-ray     Prior therapy history for the same diagnosis, illness or injury: No      Prior Level of Function  Transfers: Independent  Ambulation: Independent  ADL: Independent  IADL: Driving, Work    Living Environment  Social support: With family members   Type of home: Apartment/condo; 2-story   Stairs to enter the home: Yes 25 Is there a railing: Yes   Ramp: No   Stairs inside the home: No       Help at home: Home management tasks (cooking, cleaning)  Equipment owned: Crutches     Employment: Yes [employer]  Hobbies/Interests: fishing    Patient goals for therapy: walk in shoes and be able to work comfortably    Pain assessment: See objective evaluation for additional pain details     Objective   FOOT/ANKLE EVALUATION  PAIN: Pain Level at Rest: 0/10  Pain Level with Use: 3/10  Pain Location: ankle  Pain Quality: Aching, Dull, Sharp, Shooting, Stabbing, and  Throbbing  Pain Frequency: intermittent  Pain is Worst: daytime or in the morning when he wakes up  Pain is Exacerbated By: putting weight on it, moving   Pain is Relieved By: none  Pain Progression: Improved  INTEGUMENTARY (edema, incisions): Figure 8: 57cm  POSTURE: WNL  GAIT:   Weightbearing Status: WBAT  Assistive Device(s): CAM  Gait Deviations: Antalgic  Base of support increased  Stance time decreased  Stride length decreased  Zully decreased  BALANCE/PROPRIOCEPTION:  Not tested today  WEIGHT BEARING ALIGNMENT: Not tested today due to Wbing status  NON-WEIGHTBEARING ALIGNMENT: WNL   ROM:   (Degrees) Left AROM Left PROM  Right AROM Right PROM   Ankle Dorsiflexion   Lacking 13 Lacking 2    Ankle Plantarflexion   35 45 Painful   Ankle Inversion       Ankle Eversion       Great Toe Flexion   39 41   Great Toe Extension   29 32       STRENGTH: WNL  FLEXIBILITY: Decreased gastroc R, Decreased soleus R  SPECIAL TESTS:  Not completed today due to recent surgery  FUNCTIONAL TESTS:  Not completed today due to recent surgery  PALPATION:   + Tenderness at Location Left Right   Gastroc/Soleus + +   Achilles Tendon + +   Anterior Tibialis + +   Posterior Tibialis + +   Incisional - -   Deltoid Ligament - -   Plantar Fascia + +   Navicular - -   Medial Calcaneal - -   Peroneals - -   Anterior Talofibular Ligament + +   Posterior Talofibular Ligament + +   Calcaneofibular Ligament + +   Medial Malleolus + +   Lateral Malleolus + +     JOINT MOBILITY:    Right   Tib-Fib Proximal Not tested   Tib-Fib Distal Not tested   Talocrural Hypomobile   Subtalar Hypomobile   Midtarsal Hypomobile   First Ray Hypomobile       Assessment & Plan   CLINICAL IMPRESSIONS  Medical Diagnosis: Closed fracture of right ankle with routine healing, subsequent encounter (L60.835I)    Treatment Diagnosis: Closed fracture of right ankle with routine healing, subsequent encounter (P91.323K)   Impression/Assessment: Patient is a 28 year old male with  right ankle pain s/p right medial malleolar fracture repair complaints.  The following significant findings have been identified: Pain, Decreased ROM/flexibility, Decreased joint mobility, Decreased strength, Impaired balance, Impaired sensation, Inflammation, Impaired gait, Impaired muscle performance, and Decreased activity tolerance. These impairments interfere with their ability to perform self care tasks, work tasks, recreational activities, household chores, driving , household mobility, and community mobility as compared to previous level of function.     Clinical Decision Making (Complexity):  Clinical Presentation: Stable/Uncomplicated  Clinical Presentation Rationale: based on medical and personal factors listed in PT evaluation  Clinical Decision Making (Complexity): Low complexity    PLAN OF CARE  Treatment Interventions:  Modalities: Cryotherapy, E-stim, Ultrasound  Interventions: Gait Training, Manual Therapy, Neuromuscular Re-education, Therapeutic Activity, Therapeutic Exercise    Long Term Goals     PT Goal 1  Goal Identifier: Right ankle AROM  Goal Description: Patient will demonstrate 10 degrees of active right ankle dorsiflexion without pain to allow patient to ambulate without gait deviations or limitations in functional mobility.  Rationale: to maximize safety and independence within the community;to maximize safety and independence within the home;to maximize safety and independence with performance of ADLs and functional tasks  Target Date: 12/01/23  PT Goal 2  Goal Identifier: LEFS  Goal Description: Patient will report an improvement of LEFS score by 9 points or greater to demonstrate improved strength and allow patient to return to work and PLOF.  Rationale: to maximize safety and independence with performance of ADLs and functional tasks;to maximize safety and independence within the home;to maximize safety and independence within the community  Target Date: 12/01/23  PT Goal 3  Goal  Identifier: Work Duties  Goal Description: Patient will demonstrate simulated work tasks including climbing a ladder without pain to allow patient to return to work without limitations.  Rationale: to maximize safety and independence with performance of ADLs and functional tasks;to maximize safety and independence within the community  Target Date: 12/01/23      Frequency of Treatment: 2x/week  Duration of Treatment: 8 weeks    Recommended Referrals to Other Professionals:   Education Assessment:   Learner/Method: Patient;Listening;Reading;Demonstration;Pictures/Video;No Barriers to Learning  Education Comments: Provided education regarding prognosis and progression of physical therapy treatments    Risks and benefits of evaluation/treatment have been explained.   Patient/Family/caregiver agrees with Plan of Care.     Evaluation Time:     PT Eval, Low Complexity Minutes (09088): 30       Signing Clinician: Bonny Vaughan PT

## 2023-10-09 ENCOUNTER — THERAPY VISIT (OUTPATIENT)
Dept: PHYSICAL THERAPY | Facility: CLINIC | Age: 28
End: 2023-10-09
Attending: ORTHOPAEDIC SURGERY
Payer: OTHER MISCELLANEOUS

## 2023-10-09 DIAGNOSIS — S82.891A ANKLE FRACTURE, RIGHT, CLOSED, INITIAL ENCOUNTER: Primary | ICD-10-CM

## 2023-10-09 DIAGNOSIS — S82.891D CLOSED FRACTURE OF RIGHT ANKLE WITH ROUTINE HEALING, SUBSEQUENT ENCOUNTER: ICD-10-CM

## 2023-10-09 PROCEDURE — 97110 THERAPEUTIC EXERCISES: CPT | Mod: GP

## 2023-10-09 PROCEDURE — 97140 MANUAL THERAPY 1/> REGIONS: CPT | Mod: GP

## 2023-10-13 ENCOUNTER — THERAPY VISIT (OUTPATIENT)
Dept: PHYSICAL THERAPY | Facility: CLINIC | Age: 28
End: 2023-10-13
Attending: ORTHOPAEDIC SURGERY
Payer: OTHER MISCELLANEOUS

## 2023-10-13 DIAGNOSIS — S82.891A ANKLE FRACTURE, RIGHT, CLOSED, INITIAL ENCOUNTER: Primary | ICD-10-CM

## 2023-10-13 DIAGNOSIS — S82.891D CLOSED FRACTURE OF RIGHT ANKLE WITH ROUTINE HEALING, SUBSEQUENT ENCOUNTER: ICD-10-CM

## 2023-10-13 PROCEDURE — 97110 THERAPEUTIC EXERCISES: CPT | Mod: GP | Performed by: PHYSICAL THERAPIST

## 2023-10-13 PROCEDURE — 97140 MANUAL THERAPY 1/> REGIONS: CPT | Mod: GP | Performed by: PHYSICAL THERAPIST

## 2023-10-14 ENCOUNTER — DOCUMENTATION ONLY (OUTPATIENT)
Dept: ORTHOPEDICS | Facility: CLINIC | Age: 28
End: 2023-10-14
Payer: COMMERCIAL

## 2023-10-14 DIAGNOSIS — S82.891D RIGHT MALLEOLAR FRACTURE, CLOSED, WITH ROUTINE HEALING, SUBSEQUENT ENCOUNTER: Primary | ICD-10-CM

## 2023-10-17 ENCOUNTER — THERAPY VISIT (OUTPATIENT)
Dept: PHYSICAL THERAPY | Facility: CLINIC | Age: 28
End: 2023-10-17
Attending: ORTHOPAEDIC SURGERY
Payer: OTHER MISCELLANEOUS

## 2023-10-17 DIAGNOSIS — S82.891D CLOSED FRACTURE OF RIGHT ANKLE WITH ROUTINE HEALING, SUBSEQUENT ENCOUNTER: ICD-10-CM

## 2023-10-17 DIAGNOSIS — S82.891A ANKLE FRACTURE, RIGHT, CLOSED, INITIAL ENCOUNTER: Primary | ICD-10-CM

## 2023-10-17 PROCEDURE — 97140 MANUAL THERAPY 1/> REGIONS: CPT | Mod: GP | Performed by: PHYSICAL THERAPIST

## 2023-10-17 PROCEDURE — 97110 THERAPEUTIC EXERCISES: CPT | Mod: GP | Performed by: PHYSICAL THERAPIST

## 2023-10-20 ENCOUNTER — THERAPY VISIT (OUTPATIENT)
Dept: PHYSICAL THERAPY | Facility: CLINIC | Age: 28
End: 2023-10-20
Attending: ORTHOPAEDIC SURGERY
Payer: OTHER MISCELLANEOUS

## 2023-10-20 DIAGNOSIS — S82.891D CLOSED FRACTURE OF RIGHT ANKLE WITH ROUTINE HEALING, SUBSEQUENT ENCOUNTER: ICD-10-CM

## 2023-10-20 DIAGNOSIS — S82.891A ANKLE FRACTURE, RIGHT, CLOSED, INITIAL ENCOUNTER: Primary | ICD-10-CM

## 2023-10-20 PROCEDURE — 97116 GAIT TRAINING THERAPY: CPT | Mod: GP | Performed by: PHYSICAL THERAPIST

## 2023-10-20 PROCEDURE — 97140 MANUAL THERAPY 1/> REGIONS: CPT | Mod: GP | Performed by: PHYSICAL THERAPIST

## 2023-10-28 ENCOUNTER — APPOINTMENT (OUTPATIENT)
Dept: CT IMAGING | Facility: CLINIC | Age: 28
End: 2023-10-28
Attending: EMERGENCY MEDICINE
Payer: COMMERCIAL

## 2023-10-28 ENCOUNTER — HOSPITAL ENCOUNTER (EMERGENCY)
Facility: CLINIC | Age: 28
Discharge: HOME | End: 2023-10-28
Attending: EMERGENCY MEDICINE | Admitting: EMERGENCY MEDICINE
Payer: COMMERCIAL

## 2023-10-28 VITALS
SYSTOLIC BLOOD PRESSURE: 161 MMHG | OXYGEN SATURATION: 99 % | DIASTOLIC BLOOD PRESSURE: 147 MMHG | HEART RATE: 84 BPM | RESPIRATION RATE: 18 BRPM | TEMPERATURE: 98 F

## 2023-10-28 DIAGNOSIS — J06.9 VIRAL URI: ICD-10-CM

## 2023-10-28 DIAGNOSIS — S02.2XXA CLOSED FRACTURE OF NASAL BONE, INITIAL ENCOUNTER: ICD-10-CM

## 2023-10-28 DIAGNOSIS — S06.0X1A CONCUSSION WITH BRIEF LOSS OF CONSCIOUSNESS: ICD-10-CM

## 2023-10-28 LAB
FLUAV RNA SPEC QL NAA+PROBE: NEGATIVE
FLUBV RNA RESP QL NAA+PROBE: NEGATIVE
RSV RNA SPEC NAA+PROBE: NEGATIVE
SARS-COV-2 RNA RESP QL NAA+PROBE: NEGATIVE

## 2023-10-28 PROCEDURE — 70450 CT HEAD/BRAIN W/O DYE: CPT

## 2023-10-28 PROCEDURE — 87637 SARSCOV2&INF A&B&RSV AMP PRB: CPT | Performed by: EMERGENCY MEDICINE

## 2023-10-28 PROCEDURE — 99284 EMERGENCY DEPT VISIT MOD MDM: CPT | Mod: 25 | Performed by: EMERGENCY MEDICINE

## 2023-10-28 PROCEDURE — 99284 EMERGENCY DEPT VISIT MOD MDM: CPT | Performed by: EMERGENCY MEDICINE

## 2023-10-28 PROCEDURE — 70486 CT MAXILLOFACIAL W/O DYE: CPT

## 2023-10-28 ASSESSMENT — ENCOUNTER SYMPTOMS
FATIGUE: 1
NUMBNESS: 0
TROUBLE SWALLOWING: 0
VOICE CHANGE: 0
CONFUSION: 0
COUGH: 0
VOMITING: 0
FEVER: 0
CHILLS: 0
RHINORRHEA: 1
DIARRHEA: 0
WEAKNESS: 0
SORE THROAT: 1
SINUS PRESSURE: 0
LIGHT-HEADEDNESS: 0
SHORTNESS OF BREATH: 0
SLEEP DISTURBANCE: 1
HEADACHES: 1
MYALGIAS: 1
APPETITE CHANGE: 0

## 2023-10-28 ASSESSMENT — ACTIVITIES OF DAILY LIVING (ADL): ADLS_ACUITY_SCORE: 35

## 2023-10-28 NOTE — DISCHARGE INSTRUCTIONS
What is a concussion?  A concussion is a mild traumatic brain injury (TBI) that occurs from a direct blow or bump to the head. It can also result from a blow to another part of the body when the force travels to the head. A concussion can affect coordination, learning, memory, and emotions.     Most concussions heal without issue. However, like any other injury, a brain injury should be given time to heal, which includes physical and mental rest.     One of the most severe problems that can occur is bleeding inside the brain. If bleeding or swelling occurs, pressure in the skull rises and can cause brain injury. Bleeding might develop right after an injury or hours later, so monitoring symptoms is critical as this can be life threatening.    Signs and Symptoms  Common symptoms include:   Altered mental status including confusion, inappropriate emotions, agitation or abrupt change in personality   Amnesia/memory problems   Blurred vision/double vision/seeing stars or black spots   Dizziness, poor balance or unsteadiness   Excessive fatigue/feel slowed down   Excessive or persistent headache   Excessive sensitivity to light or loud noise    Feel  in a fog    Loss of consciousness or orientation   Ringing in ears   Vacant stare/glassy eyed   Vomiting    Why do a baseline computer test?  Neurocognitive tests, such as ImPACT , provide information on how the brain is responding to injury. ImPACT has two components: a pre- and post-concussion test. The pre-test scoring represents one s baseline (normal) brain function. The ImPACT test is then repeated post injury. Results of the pre- and post-concussion tests are compared and a care plan is developed. An ImPACT test should be completed yearly due to natural maturing of the brain.    What can I expect from [facility] concussion program?  [facility] providers will:   Facilitate ImPACT tests    Manage concussion symptoms and make  recommendations for return to activity   Facilitate post-concussion testing   Our team includes other healthcare providers, including neuropsychologists, neurologists and therapists who specialize in concussion management and will provide you with comprehensive, coordinated care    When is it safe to return to activity?  You should be free of symptoms and have returned to normal sleeping and eating patterns as well as typical concentration levels before resuming activity. Once normal activities have resumed and there are no symptoms at rest, more demanding activities may be tried. Over time, activity will be increased as long as symptoms do not return. A gradual return to activities allows us the opportunity to assess brain healing.     Under no circumstances should anyone return to activity while experiencing concussion signs or symptoms. There should be no return to activity on the same day concussion symptoms are noted or a formal diagnosis of a concussion is made.  For more information contact:  Sports concussion hotline: [phone]  Schedule an appointment: [phone]  [email or web address]    For the same link to the [facility] website above, you can use this QR code:
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
## 2023-11-01 NOTE — PROGRESS NOTES
SUBJECTIVE:  Marcel Quan is a 28 year old male who is here today for follow up of his ORIF ankle fracture on 8/23/23.  11 weeks postop     Has had very little pain. No fevers or chills.   Has  been doing range of motion home exercise program  Doing physical therapy   Has  been compliant with weight bearing restrictions. Weight bearing as tolerated in boot  Sometimes goes without the boot in the house.  Has some stiffness with dorsiflexion. Working on that in physical therapy.    OBJECTIVE:   /78 (BP Location: Left arm, Patient Position: Sitting, Cuff Size: Adult Regular)   Pulse 84   Ht 1.829 m (6')   Wt 88.5 kg (195 lb)   SpO2 97%   BMI 26.45 kg/m     Patient appears to be alert and in no apparent distress.  Skin intact.    Neurovascularly Intact.    ROM: 10 Dorsiflexion, full Plantarflexion.  Tenderness: none over the fracture site(s).    X-rays today:  Show the fracture maintaining position.  Mortice looks good.  Fracture healed    ASSESSMENT:     ICD-10-CM    1. Closed fracture of right ankle with routine healing, subsequent encounter  S82.891D XR Ankle Right G/E 3 Views     XR Ankle Right G/E 3 Views      2. Postop check  Z09 XR Ankle Right G/E 3 Views      3. Ankle fracture, right, closed, initial encounter  S82.891A          Doing well.  Fracture healed    PLAN:   Weight Bearing: As tolerated in regular shoe  Rehab:  continue physical therapy   Work: work note written  return to work full duty  Medications: none    Return to clinic as needed .       GEOVANNY Hansen MD  Dept. Orthopedic Surgery  [facility]

## 2023-11-03 ENCOUNTER — THERAPY VISIT (OUTPATIENT)
Dept: PHYSICAL THERAPY | Facility: CLINIC | Age: 28
End: 2023-11-03
Attending: ORTHOPAEDIC SURGERY
Payer: OTHER MISCELLANEOUS

## 2023-11-03 DIAGNOSIS — S82.891D CLOSED FRACTURE OF RIGHT ANKLE WITH ROUTINE HEALING, SUBSEQUENT ENCOUNTER: ICD-10-CM

## 2023-11-03 DIAGNOSIS — S82.891A ANKLE FRACTURE, RIGHT, CLOSED, INITIAL ENCOUNTER: Primary | ICD-10-CM

## 2023-11-03 PROCEDURE — 97140 MANUAL THERAPY 1/> REGIONS: CPT | Mod: GP | Performed by: PHYSICAL THERAPIST

## 2023-11-03 PROCEDURE — 97110 THERAPEUTIC EXERCISES: CPT | Mod: GP | Performed by: PHYSICAL THERAPIST

## 2023-11-08 ENCOUNTER — OFFICE VISIT (OUTPATIENT)
Dept: ORTHOPEDICS | Facility: CLINIC | Age: 28
End: 2023-11-08
Payer: OTHER MISCELLANEOUS

## 2023-11-08 ENCOUNTER — ANCILLARY PROCEDURE (OUTPATIENT)
Dept: GENERAL RADIOLOGY | Facility: CLINIC | Age: 28
End: 2023-11-08
Attending: ORTHOPAEDIC SURGERY
Payer: OTHER MISCELLANEOUS

## 2023-11-08 VITALS
DIASTOLIC BLOOD PRESSURE: 78 MMHG | BODY MASS INDEX: 26.41 KG/M2 | OXYGEN SATURATION: 97 % | WEIGHT: 195 LBS | HEIGHT: 72 IN | SYSTOLIC BLOOD PRESSURE: 131 MMHG | HEART RATE: 84 BPM

## 2023-11-08 DIAGNOSIS — S82.891A ANKLE FRACTURE, RIGHT, CLOSED, INITIAL ENCOUNTER: ICD-10-CM

## 2023-11-08 DIAGNOSIS — S82.891D CLOSED FRACTURE OF RIGHT ANKLE WITH ROUTINE HEALING, SUBSEQUENT ENCOUNTER: ICD-10-CM

## 2023-11-08 DIAGNOSIS — S82.891D CLOSED FRACTURE OF RIGHT ANKLE WITH ROUTINE HEALING, SUBSEQUENT ENCOUNTER: Primary | ICD-10-CM

## 2023-11-08 DIAGNOSIS — Z09 POSTOP CHECK: ICD-10-CM

## 2023-11-08 PROCEDURE — 99024 POSTOP FOLLOW-UP VISIT: CPT | Performed by: ORTHOPAEDIC SURGERY

## 2023-11-08 PROCEDURE — 73610 X-RAY EXAM OF ANKLE: CPT | Mod: TC | Performed by: RADIOLOGY

## 2023-11-08 ASSESSMENT — PAIN SCALES - GENERAL: PAINLEVEL: NO PAIN (0)

## 2023-11-08 NOTE — LETTER
11/8/2023         RE: Marcel Quan  [address]  [facility] [place] [facility] [address]        Dear Colleague,    Thank you for referring your patient, Marcel Quan, to the [facility]. Please see a copy of my visit note below.    SUBJECTIVE:  Marcel Quan is a 28 year old male who is here today for follow up of his ORIF ankle fracture on 8/23/23.  11 weeks postop     Has had very little pain. No fevers or chills.   Has  been doing range of motion home exercise program  Doing physical therapy   Has  been compliant with weight bearing restrictions. Weight bearing as tolerated in boot  Sometimes goes without the boot in the house.  Has some stiffness with dorsiflexion. Working on that in physical therapy.    OBJECTIVE:   /78 (BP Location: Left arm, Patient Position: Sitting, Cuff Size: Adult Regular)   Pulse 84   Ht 1.829 m (6')   Wt 88.5 kg (195 lb)   SpO2 97%   BMI 26.45 kg/m     Patient appears to be alert and in no apparent distress.  Skin intact.    Neurovascularly Intact.    ROM: 10 Dorsiflexion, full Plantarflexion.  Tenderness: none over the fracture site(s).    X-rays today:  Show the fracture maintaining position.  Mortice looks good.  Fracture healed    ASSESSMENT:     ICD-10-CM    1. Closed fracture of right ankle with routine healing, subsequent encounter  S82.891D XR Ankle Right G/E 3 Views     XR Ankle Right G/E 3 Views      2. Postop check  Z09 XR Ankle Right G/E 3 Views      3. Ankle fracture, right, closed, initial encounter  S82.891A          Doing well.  Fracture healed    PLAN:   Weight Bearing: As tolerated in regular shoe  Rehab:  continue physical therapy   Work: work note written  return to work full duty  Medications: none    Return to clinic as needed .       GEOVANNY Hansen MD  Dept. Orthopedic Surgery  [facility]      Again, thank you for allowing me to participate in the care of your patient.        Sincerely,        Jonah Peoples  MD [place]

## 2023-11-08 NOTE — LETTER
November 8, 2023      Marcel YOUNG Sample  [address] [place] [facility] [address]        To Whom It May Concern:    Marcel Quan was seen in our clinic 11-08-23. He may return to work without restrictions on 11-13-23. If you have any questions or concerns please reach out to our office.      Sincerely,        Jonah Hansen MD

## 2023-11-10 ENCOUNTER — THERAPY VISIT (OUTPATIENT)
Dept: PHYSICAL THERAPY | Facility: CLINIC | Age: 28
End: 2023-11-10
Attending: ORTHOPAEDIC SURGERY
Payer: OTHER MISCELLANEOUS

## 2023-11-10 DIAGNOSIS — S82.891D CLOSED FRACTURE OF RIGHT ANKLE WITH ROUTINE HEALING, SUBSEQUENT ENCOUNTER: ICD-10-CM

## 2023-11-10 DIAGNOSIS — S82.891A ANKLE FRACTURE, RIGHT, CLOSED, INITIAL ENCOUNTER: Primary | ICD-10-CM

## 2023-11-10 PROCEDURE — 97140 MANUAL THERAPY 1/> REGIONS: CPT | Mod: GP

## 2023-11-10 PROCEDURE — 97116 GAIT TRAINING THERAPY: CPT | Mod: GP

## 2023-11-10 PROCEDURE — 97110 THERAPEUTIC EXERCISES: CPT | Mod: GP

## 2023-12-04 ENCOUNTER — HOSPITAL ENCOUNTER (EMERGENCY)
Facility: CLINIC | Age: 28
Discharge: HOME | End: 2023-12-04
Attending: NURSE PRACTITIONER | Admitting: NURSE PRACTITIONER
Payer: COMMERCIAL

## 2023-12-04 VITALS
DIASTOLIC BLOOD PRESSURE: 74 MMHG | OXYGEN SATURATION: 100 % | BODY MASS INDEX: 25.77 KG/M2 | WEIGHT: 190 LBS | HEART RATE: 84 BPM | TEMPERATURE: 98.4 F | SYSTOLIC BLOOD PRESSURE: 128 MMHG | RESPIRATION RATE: 16 BRPM

## 2023-12-04 DIAGNOSIS — L60.0 INGROWN TOENAIL OF LEFT FOOT WITH INFECTION: ICD-10-CM

## 2023-12-04 PROCEDURE — 11750 EXCISION NAIL&NAIL MATRIX: CPT | Mod: TA | Performed by: NURSE PRACTITIONER

## 2023-12-04 PROCEDURE — 99283 EMERGENCY DEPT VISIT LOW MDM: CPT | Mod: 25 | Performed by: NURSE PRACTITIONER

## 2023-12-04 RX ORDER — CEPHALEXIN 500 MG/1
500 CAPSULE ORAL 4 TIMES DAILY
Qty: 40 CAPSULE | Refills: 0 | Status: SHIPPED | OUTPATIENT
Start: 2023-12-04 | End: 2023-12-14

## 2023-12-04 ASSESSMENT — ACTIVITIES OF DAILY LIVING (ADL): ADLS_ACUITY_SCORE: 35

## 2023-12-04 NOTE — ED TRIAGE NOTES
Pt here with toe pain, concern of ingrown toenail infection         Triage Assessment (Adult)       Row Name 12/04/23 1308          Triage Assessment    Airway WDL WDL        Respiratory WDL    Respiratory WDL WDL

## 2023-12-04 NOTE — DISCHARGE INSTRUCTIONS
Keflex 500 mg four times a day for 10 days.  Keep dressing in place until tomorrow, then warm soapy soaks 3-4x/day.    Recheck for any worsening.

## 2023-12-04 NOTE — ED PROVIDER NOTES
History     Chief Complaint   Patient presents with    Toe Pain     HPI  Marcel Quan is a 28 year old male who presents for evaluation of left great to ingrown toenail. Patient reports for the last week he has tried to remove the edge of the nail. He has been soaking the foot. Today the toe is more painful, red and drainage from the medial edge of the nail.    Allergies:  Allergies   Allergen Reactions    No Known Drug Allergy        Problem List:    Patient Active Problem List    Diagnosis Date Noted    Closed fracture of right ankle with routine healing, subsequent encounter 10/06/2023     Priority: Medium    Ankle fracture, right, closed, initial encounter 2023     Priority: Medium        Past Medical History:    Past Medical History:   Diagnosis Date    Pneumonia     RSV (respiratory syncytial virus infection)        Past Surgical History:    Past Surgical History:   Procedure Laterality Date    NO HISTORY OF SURGERY      OPEN REDUCTION INTERNAL FIXATION ANKLE Right 2023    Procedure: OPEN REDUCTION INTERNAL FIXATION, FRACTURE, RIGHT ANKLE;  Surgeon: Jonah Hansen MD;  Location: [facility] OR       Family History:    Family History   Problem Relation Age of Onset    Family History Negative No family hx of        Social History:  Marital Status:  Single [1]  Social History     Tobacco Use    Smoking status: Every Day     Packs/day: 1.00     Years: 0.60     Additional pack years: 0.00     Total pack years: 0.60     Types: Cigarettes     Last attempt to quit: 2011     Years since quittin.0    Smokeless tobacco: Former     Quit date: 2011   Vaping Use    Vaping Use: Some days    Substances: Nicotine    Devices: Disposable, Refillable tank    Passive vaping exposure: Yes   Substance Use Topics    Alcohol use: Yes     Comment: weekly    Drug use: No        Medications:    cephALEXin (KEFLEX) 500 MG capsule  acetaminophen (TYLENOL) 325 MG tablet          Review of Systems  As  mentioned above in the history present illness. All other systems were reviewed and are negative.    Physical Exam   BP: 128/74  Pulse: 84  Temp: 98.4  F (36.9  C)  Resp: 16  Weight: 86.2 kg (190 lb)  SpO2: 100 %      Physical Exam  Appearance: alert and oriented. NAD.  Left foot:  --great toe erythema and tenderness.  --medial edge of the nail is ingrown and there is macerated skin and yellow drainage present. No blanched area.  --normal sensation.    ED Course                 Procedures  Toenail Removal with digital block:  Procedure and verbal consent obtained from   Location: left great toe   Anesthesia: site cleansed with chlorhexidine. Digital block with 5ml of Lidocaine 1% injected into the base/medial aspect of toe.   Procedure: After adequate local anesthesia, the medial edge of nail bed was lifted using a nail splitter.  The nail splitter was advanced just under cuticle and nail fold.  The nail was split at this point and lateral edge was removed.  Irrigated with the toe with normal saline.  ED Tech placed bulky tube gauze dressing.              No results found for this or any previous visit (from the past 24 hour(s)).    Medications - No data to display    Assessments & Plan (with Medical Decision Making)   Patient has history and exam finding consistent with ingrown toenail with infection to the left great toe.  Medial edge of the offending toenail removed as noted above. Dressing applied.     Plan:  Keflex 500 mg four times a day for 10 days.  Keep dressing in place until tomorrow, then warm soapy soaks 3-4x/day.    Recheck for any worsening.  Discharge Medication List as of 12/4/2023  2:50 PM        START taking these medications    Details   cephALEXin (KEFLEX) 500 MG capsule Take 1 capsule (500 mg) by mouth 4 times daily for 10 days, Disp-40 capsule, R-0, E-Prescribe             Final diagnoses:   Ingrown toenail of left foot with infection       12/4/2023   [facility]  DEPT       Pino, Jessica Kwon, EDER CNP  12/04/23 [id number]

## 2024-01-06 ENCOUNTER — HOSPITAL ENCOUNTER (EMERGENCY)
Facility: CLINIC | Age: 29
Discharge: HOME | End: 2024-01-06
Attending: EMERGENCY MEDICINE | Admitting: EMERGENCY MEDICINE
Payer: COMMERCIAL

## 2024-01-06 VITALS
OXYGEN SATURATION: 99 % | TEMPERATURE: 98.1 F | DIASTOLIC BLOOD PRESSURE: 60 MMHG | SYSTOLIC BLOOD PRESSURE: 128 MMHG | BODY MASS INDEX: 26.85 KG/M2 | RESPIRATION RATE: 18 BRPM | WEIGHT: 198 LBS | HEART RATE: 75 BPM

## 2024-01-06 DIAGNOSIS — L03.032 PARONYCHIA OF TOE, LEFT: ICD-10-CM

## 2024-01-06 PROCEDURE — 99283 EMERGENCY DEPT VISIT LOW MDM: CPT | Performed by: EMERGENCY MEDICINE

## 2024-01-06 RX ORDER — CEPHALEXIN 500 MG/1
500 CAPSULE ORAL 3 TIMES DAILY
Qty: 21 CAPSULE | Refills: 0 | Status: SHIPPED | OUTPATIENT
Start: 2024-01-06 | End: 2024-01-13

## 2024-01-06 NOTE — DISCHARGE INSTRUCTIONS
Soak in warm water or warm water with Epsom salts a few times a day.  Keep clean and dry is much as possible.  I suggest getting some flip-flops for when you are in a hotel shower.      Start antibiotics as prescribed.    When you feel more healed, cut the nails straight across with a clipper.    Follow-up in clinic or with podiatry if not improving.  Return at anytime for concerns.    I hope you heal quickly!!

## 2024-01-06 NOTE — ED PROVIDER NOTES
"  History     Chief Complaint   Patient presents with    Toe Pain     HPI  History per patient, medical records    This is a 28-year-old male, basically healthy, presenting with toe pain.  Patient was seen in this ED on 2023 with ingrown toenail.  He had a partial toenail removal, was placed on Keflex and soaks, healed well.  He notes that he travels for work and did not have a nail clipper so he \"tore off his nails\" and has been having issues with redness, swelling, drainage from around the nailbeds of the left second and fourth toes.  They are quite painful.  He is wondering if he needs to have the nails excised.  No fevers or chills.  He is not diabetic.    Allergies:  Allergies   Allergen Reactions    No Known Drug Allergy        Problem List:    Patient Active Problem List    Diagnosis Date Noted    Closed fracture of right ankle with routine healing, subsequent encounter 10/06/2023     Priority: Medium    Ankle fracture, right, closed, initial encounter 2023     Priority: Medium        Past Medical History:    Past Medical History:   Diagnosis Date    Pneumonia     RSV (respiratory syncytial virus infection)        Past Surgical History:    Past Surgical History:   Procedure Laterality Date    NO HISTORY OF SURGERY      OPEN REDUCTION INTERNAL FIXATION ANKLE Right 2023    Procedure: OPEN REDUCTION INTERNAL FIXATION, FRACTURE, RIGHT ANKLE;  Surgeon: Jonah Hansen MD;  Location:  OR       Family History:    Family History   Problem Relation Age of Onset    Family History Negative No family hx of        Social History:  Marital Status:  Single [1]  Social History     Tobacco Use    Smoking status: Every Day     Packs/day: 1.00     Years: 0.60     Additional pack years: 0.00     Total pack years: 0.60     Types: Cigarettes     Last attempt to quit: 2011     Years since quittin.1    Smokeless tobacco: Former     Quit date: 2011   Vaping Use    Vaping Use: Some days    " Substances: Nicotine    Devices: Disposable, Refillable tank    Passive vaping exposure: Yes   Substance Use Topics    Alcohol use: Yes     Comment: weekly    Drug use: No        Medications:    cephALEXin (KEFLEX) 500 MG capsule  acetaminophen (TYLENOL) 325 MG tablet          Review of Systems  All other ROS reviewed and are negative or non-contributory except as stated in HPI.     Physical Exam   BP: 128/60  Pulse: 75  Temp: 98.1  F (36.7  C)  Resp: 18  Weight: 89.8 kg (198 lb)  SpO2: 99 %      Physical Exam  Vitals and nursing note reviewed.   Constitutional:       Appearance: Normal appearance. He is normal weight.      Comments: Pleasant, healthy-appearing male sitting in the bed   HENT:      Head: Normocephalic.   Eyes:      Extraocular Movements: Extraocular movements intact.   Cardiovascular:      Rate and Rhythm: Normal rate.   Pulmonary:      Effort: Pulmonary effort is normal.   Musculoskeletal:         General: Normal range of motion.      Cervical back: Normal range of motion.        Feet:    Skin:     General: Skin is warm and dry.   Neurological:      General: No focal deficit present.      Mental Status: He is alert.   Psychiatric:         Mood and Affect: Mood normal.         Behavior: Behavior normal.         ED Course (with Medical Decision Making)    Pt seen and examined by me.  RN and EPIC notes reviewed.        Patient with paronychia and infection distal second and fourth toes.  He has been in a hotel and has not been able to soak the feet.  He has very long toenails.  I did take a little bit of the crusting off, no area of pus noted.  I am going to have him soak the feet in warm water or warm water with Epsom salts.  I am going to start him on Keflex.  Closely monitor.  Follow-up in clinic or with podiatry if not improving.  Return for worsening, changes or concerns.      Procedures    No results found for this or any previous visit (from the past 24 hour(s)).    Medications - No data to  display    Assessments & Plan      I have reviewed the findings, diagnosis, plan and need for follow up with the patient.    New Prescriptions    CEPHALEXIN (KEFLEX) 500 MG CAPSULE    Take 1 capsule (500 mg) by mouth 3 times daily for 7 days       Final diagnoses:   Paronychia of toe, left - 2 and 4     Disposition: Patient discharged home in stable condition.  Plan as above.  Return for concerns.       Note: Chart documentation done in part with Dragon Voice Recognition software. Although reviewed after completion, some word and grammatical errors may remain.     1/6/2024   [facility] EMERGENCY DEPT       Renee Nice MD  01/06/24 2969

## 2024-01-06 NOTE — ED TRIAGE NOTES
Pt reports right foot pain related to two ingrown toe nails, pt had 1 removed a few weeks ago. States he still can't tolerate walking around in boots. VSS, no pain meds today     Triage Assessment (Adult)       Row Name 01/06/24 1110          Triage Assessment    Airway WDL WDL

## 2024-04-07 ENCOUNTER — HOSPITAL ENCOUNTER (EMERGENCY)
Facility: CLINIC | Age: 29
Discharge: HOME | End: 2024-04-07
Attending: STUDENT IN AN ORGANIZED HEALTH CARE EDUCATION/TRAINING PROGRAM | Admitting: STUDENT IN AN ORGANIZED HEALTH CARE EDUCATION/TRAINING PROGRAM

## 2024-04-07 VITALS
DIASTOLIC BLOOD PRESSURE: 78 MMHG | HEIGHT: 72 IN | HEART RATE: 91 BPM | SYSTOLIC BLOOD PRESSURE: 131 MMHG | BODY MASS INDEX: 26.55 KG/M2 | TEMPERATURE: 97.5 F | RESPIRATION RATE: 18 BRPM | OXYGEN SATURATION: 97 % | WEIGHT: 196 LBS

## 2024-04-07 DIAGNOSIS — J02.0 STREP PHARYNGITIS: ICD-10-CM

## 2024-04-07 PROCEDURE — 99283 EMERGENCY DEPT VISIT LOW MDM: CPT | Performed by: STUDENT IN AN ORGANIZED HEALTH CARE EDUCATION/TRAINING PROGRAM

## 2024-04-07 RX ORDER — AMOXICILLIN 500 MG/1
1000 CAPSULE ORAL 2 TIMES DAILY
Qty: 20 CAPSULE | Refills: 1 | Status: SHIPPED | OUTPATIENT
Start: 2024-04-07 | End: 2024-04-17

## 2024-04-07 ASSESSMENT — COLUMBIA-SUICIDE SEVERITY RATING SCALE - C-SSRS
1. IN THE PAST MONTH, HAVE YOU WISHED YOU WERE DEAD OR WISHED YOU COULD GO TO SLEEP AND NOT WAKE UP?: NO
2. HAVE YOU ACTUALLY HAD ANY THOUGHTS OF KILLING YOURSELF IN THE PAST MONTH?: NO
6. HAVE YOU EVER DONE ANYTHING, STARTED TO DO ANYTHING, OR PREPARED TO DO ANYTHING TO END YOUR LIFE?: NO

## 2024-04-07 NOTE — DISCHARGE INSTRUCTIONS
Your son did test positive for strep pharyngitis today.  Since you also have a sore throat, I think it is very likely that you have the same.  Prescription for amoxicillin was sent to your pharmacy.  Please fill this and use as instructed until entirely gone.  Use Tylenol/ibuprofen for discomfort.  Follow-up with your primary care doctor.  Return to the emergency department the meantime for any new or acutely worsening symptoms.

## 2024-04-07 NOTE — ED PROVIDER NOTES
History     Chief Complaint   Patient presents with    Pharyngitis     HPI  Marcel Quan is a 28 year old male with no relevant medical history who presents for evaluation of a sore throat.  Multiple family members recently tested positive for influenza B.  Patient now also has a sore throat and fatigue.  Overall his symptoms are fairly mild, but his son has had a significant cough and decreased appetite as well.  He presents today out of concern that strep pharyngitis may also be going around the home.  With new onset sore throat, he wanted to seek evaluation for that.  Patient denies any associated fevers, headaches, cough, shortness of breath, other complaints today.    Allergies:  Allergies   Allergen Reactions    No Known Drug Allergy        Problem List:    Patient Active Problem List    Diagnosis Date Noted    Closed fracture of right ankle with routine healing, subsequent encounter 10/06/2023     Priority: Medium    Ankle fracture, right, closed, initial encounter 2023     Priority: Medium        Past Medical History:    Past Medical History:   Diagnosis Date    Pneumonia     RSV (respiratory syncytial virus infection)        Past Surgical History:    Past Surgical History:   Procedure Laterality Date    NO HISTORY OF SURGERY      OPEN REDUCTION INTERNAL FIXATION ANKLE Right 2023    Procedure: OPEN REDUCTION INTERNAL FIXATION, FRACTURE, RIGHT ANKLE;  Surgeon: Jonah Hansen MD;  Location:  OR       Family History:    Family History   Problem Relation Age of Onset    Family History Negative No family hx of        Social History:  Marital Status:  Single [1]  Social History     Tobacco Use    Smoking status: Every Day     Packs/day: 1.00     Years: 0.60     Additional pack years: 0.00     Total pack years: 0.60     Types: Cigarettes     Last attempt to quit: 2011     Years since quittin.4    Smokeless tobacco: Former     Quit date: 2011   Vaping Use    Vaping Use:  Some days    Substances: Nicotine    Devices: Disposable, Refillable tank    Passive vaping exposure: Yes   Substance Use Topics    Alcohol use: Yes     Comment: weekly    Drug use: No        Medications:    amoxicillin (AMOXIL) 500 MG capsule  acetaminophen (TYLENOL) 325 MG tablet      Review of Systems   All other systems reviewed and are negative.  See HPI.    Physical Exam   BP: 131/78  Pulse: 91  Temp: 97.5  F (36.4  C)  Resp: 18  Height: 182.9 cm (6')  Weight: 88.9 kg (196 lb)  SpO2: 97 %    Physical Exam  Vitals and nursing note reviewed.   Constitutional:       General: He is not in acute distress.     Appearance: Normal appearance. He is well-developed. He is not ill-appearing or toxic-appearing.   HENT:      Head: Normocephalic and atraumatic.      Nose: No congestion or rhinorrhea.      Mouth/Throat:      Mouth: Mucous membranes are dry.      Pharynx: Uvula midline. No pharyngeal swelling, oropharyngeal exudate, posterior oropharyngeal erythema or uvula swelling.      Tonsils: No tonsillar exudate. 1+ on the right. 1+ on the left.   Eyes:      General: No scleral icterus.     Conjunctiva/sclera: Conjunctivae normal.   Cardiovascular:      Rate and Rhythm: Normal rate.      Heart sounds: Normal heart sounds.   Pulmonary:      Effort: Pulmonary effort is normal. No respiratory distress.      Breath sounds: Normal breath sounds.   Abdominal:      Palpations: Abdomen is soft.      Tenderness: There is no abdominal tenderness.   Musculoskeletal:         General: No deformity.      Cervical back: Neck supple.   Skin:     General: Skin is warm.      Capillary Refill: Capillary refill takes less than 2 seconds.   Neurological:      Mental Status: He is alert and oriented to person, place, and time.   Psychiatric:         Mood and Affect: Mood normal.       ED Course        Procedures            No results found for this or any previous visit (from the past 24 hour(s)).    Medications - No data to  display    Assessments & Plan (with Medical Decision Making)     I have reviewed the nursing notes.    I have reviewed the findings, diagnosis, plan and need for follow up with the patient.  Medical Decision Making  Marcel Quan is a 28 year old male with no relevant medical history who presents for evaluation of a sore throat.  Normal vitals on arrival, afebrile.  Exam is very reassuring.  He appears in no distress.  Oropharynx largely unremarkable; no evidence of significant tonsillar edema or exudate, uvula is midline.  Lungs are clear to auscultation.  Patient's significant other is currently very ill with influenza and he presents mainly out of concern that there may also be strep pharyngitis going around the home due to everyone also having sore throats.  He feels as though he has very mild symptoms.  We discussed potentially testing for strep pharyngitis and influenza, but he ultimately decided against it.  If he does develop flulike symptoms, they are very likely due to influenza B because it is already going around the home.  Patient mainly presents to see whether strep is going around the home as well.  Since his son has more intense symptoms, we tested him as a proxy.  This did come back positive and since the patient also has a sore throat, will treat him empirically.  Also advised supportive cares for any other symptoms that may develop.  Patient will follow-up with his primary care doctor and agrees to return sooner for any new or acutely worsening symptoms.    Discharge Medication List as of 4/7/2024 10:53 AM        START taking these medications    Details   amoxicillin (AMOXIL) 500 MG capsule Take 2 capsules (1,000 mg) by mouth 2 times daily for 10 days, Disp-20 capsule, R-1, Local Print             Final diagnoses:   Strep pharyngitis       4/7/2024   [facility] EMERGENCY DEPT       Oscar Greene MD  04/07/24 7061

## 2024-04-07 NOTE — ED TRIAGE NOTES
Pt presents with concerns of a sore throat for the last three days.  Pt was exposed to influenza.  Pt brought his son to the ED to be seen so stated as long as he was here he would be seen.       Triage Assessment (Adult)       Row Name 04/07/24 1006          Triage Assessment    Airway WDL WDL        Respiratory WDL    Respiratory WDL WDL        Skin Circulation/Temperature WDL    Skin Circulation/Temperature WDL WDL        Cardiac WDL    Cardiac WDL WDL        Peripheral/Neurovascular WDL    Peripheral Neurovascular WDL WDL        Cognitive/Neuro/Behavioral WDL    Cognitive/Neuro/Behavioral WDL WDL

## 2024-04-07 NOTE — Clinical Note
Marcel Quan was seen and treated in our emergency department on 4/7/2024.  He may return to work on 04/09/2024.       If you have any questions or concerns, please don't hesitate to call.      Oscar Greene MD

## 2024-04-10 ENCOUNTER — DOCUMENTATION ONLY (OUTPATIENT)
Dept: ORTHOPEDICS | Facility: CLINIC | Age: 29
End: 2024-04-10

## 2024-04-10 NOTE — PROGRESS NOTES
Form completed for: Marcel YOUNG Sample  What was done with form: faxed to [facility] [employer]. Note: patient discharged from orthopedic care Sept 2023.  Fax # [phone].    Anil SCHAEFER

## 2024-06-28 PROBLEM — S82.891D CLOSED FRACTURE OF RIGHT ANKLE WITH ROUTINE HEALING, SUBSEQUENT ENCOUNTER: Status: RESOLVED | Noted: 2023-10-06 | Resolved: 2024-06-28

## 2024-06-28 PROBLEM — S82.891A ANKLE FRACTURE, RIGHT, CLOSED, INITIAL ENCOUNTER: Status: RESOLVED | Noted: 2023-08-18 | Resolved: 2024-06-28

## 2024-06-29 NOTE — PROGRESS NOTES
DISCHARGE  Reason for Discharge: Patient has failed to schedule further appointments.    Equipment Issued:     Discharge Plan: Patient to continue home program.    Referring Provider:  Jonah Hansen       11/10/23 0500   Appointment Info   Signing clinician's name / credentials Bonny Vaughan, PT, DPT   Total/Authorized Visits W/C   Visits Used 8   Medical Diagnosis Closed fracture of right ankle with routine healing, subsequent encounter (S82.568Q)   PT Tx Diagnosis Closed fracture of right ankle with routine healing, subsequent encounter (S82.049E)   Precautions/Limitations WBAT in boot, WBAT without boot in PT only   Progress Note/Certification   Onset of illness/injury or Date of Surgery 08/23/23   Therapy Frequency 2x/week   Predicted Duration 8 weeks   Progress Note Due Date 12/01/23   GOALS   PT Goals 2;3   PT Goal 1   Goal Identifier Right ankle AROM   Goal Description Patient will demonstrate 10 degrees of active right ankle dorsiflexion without pain to allow patient to ambulate without gait deviations or limitations in functional mobility.   Rationale to maximize safety and independence within the community;to maximize safety and independence within the home;to maximize safety and independence with performance of ADLs and functional tasks   Goal Progress Patient demonstrated active right ankle DF to neutral without pain.   Target Date 12/01/23   PT Goal 2   Goal Identifier LEFS   Goal Description Patient will report an improvement of LEFS score by 9 points or greater to demonstrate improved strength and allow patient to return to work and PLOF.   Rationale to maximize safety and independence with performance of ADLs and functional tasks;to maximize safety and independence within the home;to maximize safety and independence within the community   Goal Progress Not tested today.   Target Date 12/01/23   PT Goal 3   Goal Identifier Work Duties   Goal Description Patient will demonstrate simulated  work tasks including climbing a ladder without pain to allow patient to return to work without limitations.   Rationale to maximize safety and independence with performance of ADLs and functional tasks;to maximize safety and independence within the community   Target Date 12/01/23   Subjective Report   Subjective Report Patient reports no right ankle pain today. He had a follow-up with the surgeon on 11/8 and was cleared to begin walking without the boot. He was cleared to return to work at [employer]  on Monday working between 6 and 8 hours. Patient reports good adherence with HEP performing daily.   Objective Measure 1   Objective Measure Pain   Details 0/10   Objective Measure 2   Objective Measure Right ankle AROM   Details DF: neutral, PF: 51   Objective Measure 3   Objective Measure LEFS   Details 21   Treatment Interventions (PT)   Interventions Gait Training;Manual Therapy   Therapeutic Procedure/Exercise   Therapeutic Procedures: strength, endurance, ROM, flexibility minutes (24522) 15   Ther Proc 1 - Details Ankle pumps and circles to warm up the ankle. In sitting on BAPS board: R ankle DF/PF and lateral x 20 each. clockwise and counterclockwise circles x 10 each. Squats x 10 with verbal cues to keep knees posterior to toes and shift weight posteriorly, lunges with R foot foward x 10 with verbal cues to avoid excessive R knee valgus. Added lunges to HEP.  Handout given.   Skilled Intervention Instruction in appropriate exercises, education   Patient Response/Progress Demonstrates understanding, plans to order a wobble board for home.   Gait Training   Gait Training Minutes, includes stair climbing ([id number]) 15   Gait 1 - Details Forward ambulation with verbal cues to increase heel to toe gait pattern, sidestepping left and right, slow, controlled. Provided education on wearing footwear with a minimal heel as much as possible to decrease ankle PF and assist in normalizing gait pattern.   Skilled Intervention  gait training   Patient Response/Progress Tolerated well with minimal increase in Right ankle soreness during or following. Verbalized understanding of education provided.   Manual Therapy   Manual Therapy: Mobilization, MFR, MLD, friction massage minutes (25666) 15   Manual Therapy 1 - Details Grade 1-2 A/P midfoot mobs, grade 2-3 A/P talocrural mobs, grade 2-3 subtalar mobs. Passive DF/PF with gentle overpressure at end feel following mobs.   Skilled Intervention Performed grade peripheral joint mobilizations and adapted based on tissue and patient response.   Patient Response/Progress Tolerated well with no adverse effects and decreased stiffness with gait and ROM following.   Education   Learner/Method Patient;Listening;Reading;Demonstration;Pictures/Video;No Barriers to Learning   Education Comments POC, HEP   Plan   Home program Continue with home program, add gastroc/soleus stretch.   Plan for next session Continue ROM exercises and manual therapy,  WBing exercises without boot, not cleared for standing balance board exercise by surgeon yet   Total Session Time   Timed Code Treatment Minutes 45   Total Treatment Time (sum of timed and untimed services) 45

## 2024-10-12 ENCOUNTER — HEALTH MAINTENANCE LETTER (OUTPATIENT)
Age: 29
End: 2024-10-12

## (undated) DEVICE — GLOVE BIOGEL PI ULTRATOUCH G SZ 7.5 42175

## (undated) DEVICE — PACK EXTREMITY SOP15EXFSD

## (undated) DEVICE — CAST PADDING 4" STERILE 9044S

## (undated) DEVICE — SOL PRP PVP IOD .75OZ PCH PKT CNTNR STRL DYNDA2232A

## (undated) DEVICE — DRSG GAUZE 4X4" 2187

## (undated) DEVICE — GLOVE BIOGEL PI ULTRATOUCH G SZ 8.5 42185

## (undated) DEVICE — DRAPE C-ARM 60X42" 1013

## (undated) DEVICE — NDL 19GA 1.5"

## (undated) DEVICE — SU ETHILON 3-0 PS-2 18" 1669H

## (undated) DEVICE — ESU GROUND PAD ADULT W/CORD E7507

## (undated) DEVICE — DRSG ADAPTIC 3X3" 2012

## (undated) DEVICE — CAST PLASTER SPLINT 5X30" 7395

## (undated) DEVICE — PREP CHLORAPREP 26ML TINTED ORANGE  260815

## (undated) DEVICE — DRAPE STERI U 1015

## (undated) DEVICE — GLOVE BIOGEL PI MICRO INDICATOR UNDERGLOVE SZ 7.5 48975

## (undated) DEVICE — GUIDE WIRE 1.25X150MM NON THRD  900.721

## (undated) DEVICE — DRILL BIT QUICK COUPLING CANN 2.7MM 310.67

## (undated) DEVICE — GLOVE BIOGEL PI MICRO INDICATOR UNDERGLOVE SZ 8.0 48980

## (undated) DEVICE — NDL 22GA 1.5"

## (undated) DEVICE — SOL WATER IRRIG 1000ML BOTTLE 07139-09

## (undated) DEVICE — SU VICRYL 2-0 SH 27" UND J417H

## (undated) DEVICE — CAST PADDING 4" UNSTERILE 9044

## (undated) DEVICE — BNDG ELASTIC 4" DBL LENGTH UNSTERILE 6611-14

## (undated) RX ORDER — FENTANYL CITRATE 50 UG/ML
INJECTION, SOLUTION INTRAMUSCULAR; INTRAVENOUS
Status: DISPENSED
Start: 2023-08-23

## (undated) RX ORDER — PROPOFOL 10 MG/ML
INJECTION, EMULSION INTRAVENOUS
Status: DISPENSED
Start: 2023-08-23

## (undated) RX ORDER — BUPIVACAINE HYDROCHLORIDE 2.5 MG/ML
INJECTION, SOLUTION EPIDURAL; INFILTRATION; INTRACAUDAL
Status: DISPENSED
Start: 2023-08-23

## (undated) RX ORDER — BUPIVACAINE HYDROCHLORIDE AND EPINEPHRINE 2.5; 5 MG/ML; UG/ML
INJECTION, SOLUTION EPIDURAL; INFILTRATION; INTRACAUDAL; PERINEURAL
Status: DISPENSED
Start: 2023-08-23